# Patient Record
Sex: MALE | Race: WHITE | Employment: FULL TIME | URBAN - METROPOLITAN AREA
[De-identification: names, ages, dates, MRNs, and addresses within clinical notes are randomized per-mention and may not be internally consistent; named-entity substitution may affect disease eponyms.]

---

## 2017-02-14 ENCOUNTER — TELEPHONE (OUTPATIENT)
Dept: FAMILY MEDICINE CLINIC | Age: 52
End: 2017-02-14

## 2017-02-14 NOTE — TELEPHONE ENCOUNTER
Deborah Chavis  [phone]    Patient's wife, Silvester Mortimer, is asking for a return call with the name/number of the doctor that Dr. Ander Wisdom referred her  to for a colonoscopy. She states that her  misplaced the paper that he was given with this information. She asked that a detailed message be left if she does not answer.

## 2017-12-04 ENCOUNTER — OFFICE VISIT (OUTPATIENT)
Dept: FAMILY MEDICINE CLINIC | Age: 52
End: 2017-12-04

## 2017-12-04 VITALS
HEART RATE: 60 BPM | TEMPERATURE: 97.2 F | HEIGHT: 68 IN | DIASTOLIC BLOOD PRESSURE: 85 MMHG | RESPIRATION RATE: 18 BRPM | OXYGEN SATURATION: 99 % | BODY MASS INDEX: 24.95 KG/M2 | WEIGHT: 164.6 LBS | SYSTOLIC BLOOD PRESSURE: 129 MMHG

## 2017-12-04 DIAGNOSIS — J34.0 NASAL ULCER: ICD-10-CM

## 2017-12-04 DIAGNOSIS — R68.89 COLD INTOLERANCE: Primary | ICD-10-CM

## 2017-12-04 DIAGNOSIS — J30.89 ALLERGIC RHINITIS CAUSED BY MOLD: ICD-10-CM

## 2017-12-04 RX ORDER — MINERAL OIL
180 ENEMA (ML) RECTAL
COMMUNITY
End: 2018-05-07 | Stop reason: ALTCHOICE

## 2017-12-04 RX ORDER — LORATADINE 10 MG/1
10 TABLET ORAL
Qty: 30 TAB | COMMUNITY

## 2017-12-04 RX ORDER — ASCORBIC ACID 250 MG
TABLET ORAL
COMMUNITY
End: 2019-01-31 | Stop reason: ALTCHOICE

## 2017-12-04 NOTE — LETTER
12/29/2017 11:07 AM 
 
Mr. Rogelio Bustamante [facility] [address] [address] Dear Rogelio Bustamante: 
 
Please find your most recent results below. Resulted Orders METABOLIC PANEL, COMPREHENSIVE Result Value Ref Range Glucose 85 65 - 99 mg/dL BUN 16 6 - 24 mg/dL Creatinine 1.05 0.76 - 1.27 mg/dL GFR est non-AA 80 >59 mL/min/1.73 GFR est AA 93 >59 mL/min/1.73  
 BUN/Creatinine ratio 15 9 - 20 Sodium 142 134 - 144 mmol/L Potassium 4.4 3.5 - 5.2 mmol/L Chloride 101 96 - 106 mmol/L  
 CO2 26 18 - 29 mmol/L Calcium 9.8 8.7 - 10.2 mg/dL Protein, total 7.4 6.0 - 8.5 g/dL Albumin 5.0 3.5 - 5.5 g/dL GLOBULIN, TOTAL 2.4 1.5 - 4.5 g/dL A-G Ratio 2.1 1.2 - 2.2 Bilirubin, total 0.3 0.0 - 1.2 mg/dL Alk. phosphatase 65 39 - 117 IU/L  
 AST (SGOT) 15 0 - 40 IU/L  
 ALT (SGPT) 15 0 - 44 IU/L Narrative Performed at:  [address]  [id number] : Royce Camargo MD, Phone:  [phone] CBC W/O DIFF Result Value Ref Range WBC 7.1 3.4 - 10.8 x10E3/uL  
 RBC 4.71 4.14 - 5.80 x10E6/uL HGB 14.0 13.0 - 17.7 g/dL Comment: **Please note reference interval change** HCT 40.6 37.5 - 51.0 % MCV 86 79 - 97 fL  
 MCH 29.7 26.6 - 33.0 pg  
 MCHC 34.5 31.5 - 35.7 g/dL  
 RDW 14.2 12.3 - 15.4 % PLATELET 222 790 - 951 x10E3/uL Narrative Performed at:  [address]  [id number] : Royce Camargo MD, Phone:  [phone] TSH 3RD GENERATION Result Value Ref Range TSH 2.240 0.450 - 4.500 uIU/mL Narrative Performed at:  [address]  [id number] : Royce Camargo MD, Phone:  [phone] T4, FREE Result Value Ref Range T4, Free 1.01 0.82 - 1.77 ng/dL Narrative Performed at:  [address]  [id number] : Graciela Christensen MD, Phone:  [phone] RECOMMENDATIONS: 
 
Tests for being \"cold\" due to a health problem are all normal including thyroid and tests for anemia. This is normal for you and of no concern. Your other tests are all normal.  No diabetes, normal liver and kidney tests.  
    
   
 
 
Please call me if you have any questions: [phone] Sincerely, 
 
 
Jeannette Krabbe, MD

## 2017-12-04 NOTE — MR AVS SNAPSHOT
Visit Information Date & Time Provider Department Dept. Phone Encounter #  
 12/4/2017  4:00 PM Bianca Stahl MD [facility] [phone] [id number] Your Appointments 5/7/2018  9:00 AM  
COMPLETE PHYSICAL with Bianca Stahl MD  
[facility]) Appt Note: for his yearly cpe/  
 [address] [address]  
[phone]  
  
   
 [address] [address] Upcoming Health Maintenance Date Due Influenza Age 5 to Adult 8/1/2017 DTaP/Tdap/Td series (2 - Td) 3/28/2024 COLONOSCOPY 3/27/2027 Allergies as of 12/4/2017  Review Complete On: 12/4/2017 By: Ashlee Burrell LPN Severity Noted Reaction Type Reactions Naprosyn [Naproxen]  04/03/2015    Other (comments)  
 dizziness Current Immunizations  Never Reviewed Name Date Tdap 3/28/2014 Not reviewed this visit You Were Diagnosed With   
  
 Codes Comments Cold intolerance    -  Primary ICD-10-CM: R68.89 ICD-9-CM: 780.99 Vitals BP Pulse Temp Resp Height(growth percentile) Weight(growth percentile) 129/85 (BP 1 Location: Left arm, BP Patient Position: Sitting) 60 97.2 °F (36.2 °C) (Oral) 18 5' 8\" (1.727 m) 164 lb 9.6 oz (74.7 kg) SpO2 BMI Smoking Status 99% 25.03 kg/m2 Never Smoker Vitals History BMI and BSA Data Body Mass Index Body Surface Area 25.03 kg/m 2 1.89 m 2 Preferred Pharmacy Pharmacy Name Phone [facility] [address] [place] [address], [address] [place] [address] [phone] Your Updated Medication List  
  
   
This list is accurate as of: 12/4/17  5:16 PM.  Always use your most recent med list. ADVIL 200 mg tablet Generic drug:  ibuprofen Take 3-4 Tabs by mouth every six (6) hours as needed for Pain. VITAMIN C 250 mg tablet Generic drug:  ascorbic acid (vitamin C) Take  by mouth. We Performed the Following CBC W/O DIFF [72355 CPT(R)] METABOLIC PANEL, COMPREHENSIVE [28430 CPT(R)] T4, FREE [id number] CPT(R)] TSH 3RD GENERATION [69896 CPT(R)] Introducing [facility] SERVICES! Dear Luis Files: Thank you for requesting a [employer] account. Our records indicate that you already have an active [employer] account. You can access your account anytime at [email or web address]. [employer]/[place] Did you know that you can access your hospital and ER discharge instructions at any time in [employer]? You can also review all of your test results from your hospital stay or ER visit. Additional Information If you have questions, please visit the Frequently Asked Questions section of the [employer] website at [email or web address]/[email or web address] Remember, [employer] is NOT to be used for urgent needs. For medical emergencies, dial 911. Now available from your iPhone and Android! Please provide this summary of care documentation to your next provider. Your primary care clinician is listed as [address] [address], [facility]/s . If you have any questions after today's visit, please call [phone].

## 2017-12-04 NOTE — PROGRESS NOTES
HISTORY OF PRESENT ILLNESS  HPI  Amaury Aiken is a 45 y.o. male who presents to office today for elevated BP. Pt states that his BP was in the 160s/100s when he checked it two days ago and yesterday; he notes that he had a headache the first day he checked it and hasn't been feeling well in general. He notes his BP was normal when he checked it 2-3 weeks ago. Pt complains of always feeling cold since around 3 years ago. Pt complains of runny nose and nasal congestion, which he states he typically has this time of year. Pt notes a sore ulcer inside his nose that he notices about once a week. Past Medical History:   Diagnosis Date    Hepatitis      History reviewed. No pertinent surgical history. Current Outpatient Prescriptions on File Prior to Visit   Medication Sig Dispense Refill    ibuprofen (ADVIL) 200 mg tablet Take 3-4 Tabs by mouth every six (6) hours as needed for Pain. No current facility-administered medications on file prior to visit. Allergies   Allergen Reactions    Naprosyn [Naproxen] Other (comments)     dizziness     Family History   Problem Relation Age of Onset    No Known Problems Father     Hypertension Mother     Stroke Mother     No Known Problems Paternal Grandmother     No Known Problems Paternal Grandfather     No Known Problems Maternal Grandmother     No Known Problems Maternal Grandfather      Social History     Social History    Marital status: SINGLE     Spouse name: N/A    Number of children: N/A    Years of education: N/A     Social History Main Topics    Smoking status: Never Smoker    Smokeless tobacco: Never Used    Alcohol use Yes      Comment: maybe 1-2 drinks per 2 weeks    Drug use: No    Sexual activity: Yes     Other Topics Concern    None     Social History Narrative             Review of Systems   Constitutional: Positive for malaise/fatigue (feeling cold). Negative for chills, diaphoresis, fever and weight loss.    HENT: Positive for congestion (nasal). Runny nose   Eyes: Negative for blurred vision, double vision, pain and redness. Respiratory: Negative for cough, shortness of breath and wheezing. Cardiovascular: Negative for chest pain, palpitations, orthopnea, claudication, leg swelling and PND. Skin: Negative for itching and rash. sore spot inside nose   Neurological: Negative for dizziness, tingling, tremors, sensory change, speech change, focal weakness, seizures, loss of consciousness, weakness and headaches. Results for orders placed or performed in visit on 96/92/98   METABOLIC PANEL, COMPREHENSIVE   Result Value Ref Range    Glucose 90 65 - 99 mg/dL    BUN 13 6 - 24 mg/dL    Creatinine 0.78 0.76 - 1.27 mg/dL    GFR est non- >59 mL/min/1.73    GFR est  >59 mL/min/1.73    BUN/Creatinine ratio 17 9 - 20    Sodium 141 136 - 144 mmol/L    Potassium 4.2 3.5 - 5.2 mmol/L    Chloride 103 97 - 106 mmol/L    CO2 23 18 - 29 mmol/L    Calcium 9.4 8.7 - 10.2 mg/dL    Protein, total 6.7 6.0 - 8.5 g/dL    Albumin 4.6 3.5 - 5.5 g/dL    GLOBULIN, TOTAL 2.1 1.5 - 4.5 g/dL    A-G Ratio 2.2 1.1 - 2.5    Bilirubin, total 0.4 0.0 - 1.2 mg/dL    Alk.  phosphatase 57 39 - 117 IU/L    AST (SGOT) 15 0 - 40 IU/L    ALT (SGPT) 21 0 - 44 IU/L   LIPID PANEL   Result Value Ref Range    Cholesterol, total 207 (H) 100 - 199 mg/dL    Triglyceride 225 (H) 0 - 149 mg/dL    HDL Cholesterol 44 >39 mg/dL    VLDL, calculated 45 (H) 5 - 40 mg/dL    LDL, calculated 118 (H) 0 - 99 mg/dL   CBC W/O DIFF   Result Value Ref Range    WBC 6.1 3.4 - 10.8 x10E3/uL    RBC 4.39 4.14 - 5.80 x10E6/uL    HGB 13.2 12.6 - 17.7 g/dL    HCT 38.8 37.5 - 51.0 %    MCV 88 79 - 97 fL    MCH 30.1 26.6 - 33.0 pg    MCHC 34.0 31.5 - 35.7 g/dL    RDW 13.8 12.3 - 15.4 %    PLATELET 125 864 - 367 x10E3/uL   URINALYSIS W/MICROSCOPIC   Result Value Ref Range    Specific Gravity 1.023 1.005 - 1.030    pH (UA) 5.5 5.0 - 7.5    Color Yellow Yellow    Appearance Cloudy (A) Clear    Leukocyte Esterase Negative Negative    Protein 1+ (A) Negative/Trace    Glucose Negative Negative    Ketone Negative Negative    Blood Negative Negative    Bilirubin Negative Negative    Urobilinogen 0.2 0.2 - 1.0 mg/dL    Nitrites Negative Negative    Microscopic Examination See additional order    PSA DIAGNOSTIC (PROSTATIC SPECIFIC AG)   Result Value Ref Range    Prostate Specific Ag 2.8 0.0 - 4.0 ng/mL   CHRONIC HEPATITIS PANEL   Result Value Ref Range    Hep B surface Ag screen Negative Negative    Hepatitis Be Antigen Negative Negative    Hep B Core Ab, IgM Negative Negative    Hep B Core Ab, total Negative Negative    Hepatitis Be Antibody Negative Negative    HEP B SURFACE AB, QUAL Non Reactive     HCV Ab <0.1 0.0 - 0.9 s/co ratio   HEPATITIS A AB, IGM & TOTAL   Result Value Ref Range    Hepatitis A Ab, IgM Negative Negative    Hep A Ab, total Positive (A) Negative   MICROSCOPIC EXAMINATION   Result Value Ref Range    WBC 0-5 0 - 5 /hpf    RBC 0-2 0 - 2 /hpf    Epithelial cells 0-10 0 - 10 /hpf    Casts None seen None seen /lpf    Mucus Present Not Estab. Bacteria None seen None seen/Few   COMMENT   Result Value Ref Range    Comment Comment    CVD REPORT   Result Value Ref Range    INTERPRETATION Note              Physical Exam  Visit Vitals    /85 (BP 1 Location: Left arm, BP Patient Position: Sitting)    Pulse 60    Temp 97.2 °F (36.2 °C) (Oral)    Resp 18    Ht 5' 8\" (1.727 m)    Wt 164 lb 9.6 oz (74.7 kg)    SpO2 99%    BMI 25.03 kg/m2     Head: Normocephalic, without obvious abnormality, atraumatic  Eyes: conjunctivae/corneas clear. PERRL, EOM's intact.    Neck: supple, symmetrical, trachea midline, no adenopathy, thyroid: not enlarged, symmetric, no tenderness/mass/nodules, no carotid bruit and no JVD  Lungs: clear to auscultation bilaterally  Heart: regular rate and rhythm, S1, S2 normal, no murmur, click, rub or gallop  Extremities: extremities normal, atraumatic, no cyanosis or edema  Pulses: 2+ and symmetric  Lymph nodes: Cervical, supraclavicular, and axillary nodes normal.  Neurologic: Grossly normal          ASSESSMENT and PLAN    ICD-10-CM ICD-9-CM    1. Cold intolerance A10.52 084.75 METABOLIC PANEL, COMPREHENSIVE      CBC W/O DIFF      TSH 3RD GENERATION      T4, FREE   2. Nasal ulcer J34.0 478.19    3. Allergic rhinitis caused by mold J30.89 477.8 loratadine (CLARITIN) 10 mg tablet      fexofenadine (ALLEGRA) 180 mg tablet     Diagnoses and all orders for this visit:    1. Cold intolerance  -     METABOLIC PANEL, COMPREHENSIVE  -     CBC W/O DIFF  -     TSH 3RD GENERATION  -     T4, FREE    2. Nasal ulcer    3. Allergic rhinitis caused by mold      Follow-up Disposition: Not on File     lab results and schedule of future lab studies reviewed with patient  reviewed diet, exercise and weight control  cardiovascular risk and specific lipid/LDL goals reviewed  reviewed medications and side effects in detail  Please call my office if there are any questions- [phone]. I will arrange for follow up after the lab tests done from today return  Recommended a weekly \"heart check. \" I went into detail how to do this. Call for refills on medications as needed. Discussed expected course/resolution/complications of diagnosis in detail with patient. Medication risks/benefits/costs/interactions/alternatives discussed with patient. Pt was given an after visit summary which includes diagnoses, current medications & vitals. Pt expressed understanding with the diagnosis and plan. Total 25 minutes,60 % counseling re: We had a long discussion telling him that his elevated BP is not harmful unless it's up all the time. He should check it on his average day, not when he's feeling ill, having pain, or exercising. If it's over 140/90 on those days more than 1/3 times, we need to consider treatment.    Reviewed in detail the proper technique of checking the blood pressure- check it on an average day only, not on a stressful day, sitting, no exercise for at least 1 hour and not experiencing any new pain( chronic pain is OK). Patient encouraged to check BP sitting and standing at least once a month and to report these readings to me if > 140/ 90 on average , or if the standing BP is >  15 points lower than the sitting. Reviewed symptoms, or lack thereof, of hypertension and elevated cholesterol. We talked about him feeling cold; he complained about this a couple years ago. We checked the lab work then, including thyroid tests and CBC, and it all came back normal; we'll check it again today. BMI is slightly elevated- in the overweight range. I reviewed diet, exercise and weight control. Discussed weight control in detail, the importance of mainly decreased carbs, and for weight maintenance, exercise; discussed different diets and that it isn't as important to watch the type of foods as it is to decrease calorie intake no matter what type of diet you do, etc.     For his nasal congestion, I recommended he try Allegra or Claritin. If no better in 2-3 days, he should switch to the other. If still no better, we'll have him stay on the one that seems to help the most and call for a nasal steroid. He'll do that through [address] I informed him that he needs to stay on medicine throughout his allergy season, which seems to be mostly mold, as he has problems from late summer to spring that seems to get better. For his nasal soreness, I suggested he use Bacitracin antibiotic ointment prn up to BID. If he continues to have problems with this, we'll have him see the ENT doctor. Also, discussed symptoms of concern that were noted today in the note above, treatment options( including doing nothing), when to follow up before recommended time frame. Also, answered all questions.     This document was written by Maik Ferrari, as dictated by Amari Morales MD.  I have reviewed and agree with the above note and have made corrections where appropriate Twan Flores M.D.

## 2017-12-04 NOTE — PROGRESS NOTES
\"Reviewed record in preparation for visit and have obtained the necessary documentation\"  Chief Complaint   Patient presents with    Elevated Blood Pressure     \"going up and down\" 160's/100's       1. Have you been to the ER, urgent care clinic since your last visit? Hospitalized since your last visit? No    2. Have you seen or consulted any other health care providers outside of the [address] [place] [address] since your last visit? Include any pap smears or colon screening.  No

## 2017-12-05 LAB
ALBUMIN SERPL-MCNC: 5 G/DL (ref 3.5–5.5)
ALBUMIN/GLOB SERPL: 2.1 {RATIO} (ref 1.2–2.2)
ALP SERPL-CCNC: 65 IU/L (ref 39–117)
ALT SERPL-CCNC: 15 IU/L (ref 0–44)
AST SERPL-CCNC: 15 IU/L (ref 0–40)
BILIRUB SERPL-MCNC: 0.3 MG/DL (ref 0–1.2)
BUN SERPL-MCNC: 16 MG/DL (ref 6–24)
BUN/CREAT SERPL: 15 (ref 9–20)
CALCIUM SERPL-MCNC: 9.8 MG/DL (ref 8.7–10.2)
CHLORIDE SERPL-SCNC: 101 MMOL/L (ref 96–106)
CO2 SERPL-SCNC: 26 MMOL/L (ref 18–29)
CREAT SERPL-MCNC: 1.05 MG/DL (ref 0.76–1.27)
ERYTHROCYTE [DISTWIDTH] IN BLOOD BY AUTOMATED COUNT: 14.2 % (ref 12.3–15.4)
GFR SERPLBLD CREATININE-BSD FMLA CKD-EPI: 80 ML/MIN/1.73
GFR SERPLBLD CREATININE-BSD FMLA CKD-EPI: 93 ML/MIN/1.73
GLOBULIN SER CALC-MCNC: 2.4 G/DL (ref 1.5–4.5)
GLUCOSE SERPL-MCNC: 85 MG/DL (ref 65–99)
HCT VFR BLD AUTO: 40.6 % (ref 37.5–51)
HGB BLD-MCNC: 14 G/DL (ref 13–17.7)
MCH RBC QN AUTO: 29.7 PG (ref 26.6–33)
MCHC RBC AUTO-ENTMCNC: 34.5 G/DL (ref 31.5–35.7)
MCV RBC AUTO: 86 FL (ref 79–97)
PLATELET # BLD AUTO: 252 X10E3/UL (ref 150–379)
POTASSIUM SERPL-SCNC: 4.4 MMOL/L (ref 3.5–5.2)
PROT SERPL-MCNC: 7.4 G/DL (ref 6–8.5)
RBC # BLD AUTO: 4.71 X10E6/UL (ref 4.14–5.8)
SODIUM SERPL-SCNC: 142 MMOL/L (ref 134–144)
T4 FREE SERPL-MCNC: 1.01 NG/DL (ref 0.82–1.77)
TSH SERPL DL<=0.005 MIU/L-ACNC: 2.24 UIU/ML (ref 0.45–4.5)
WBC # BLD AUTO: 7.1 X10E3/UL (ref 3.4–10.8)

## 2017-12-29 NOTE — PROGRESS NOTES
Tests for being \"cold\" due to a health problem are all normal including thyroid and tests for anemia. This is normal for you and of no concern. Your other tests are all normal.  No diabetes, normal liver and kidney tests.

## 2018-05-07 ENCOUNTER — OFFICE VISIT (OUTPATIENT)
Dept: FAMILY MEDICINE CLINIC | Age: 53
End: 2018-05-07

## 2018-05-07 VITALS
DIASTOLIC BLOOD PRESSURE: 62 MMHG | BODY MASS INDEX: 24.55 KG/M2 | HEIGHT: 68 IN | WEIGHT: 162 LBS | HEART RATE: 58 BPM | TEMPERATURE: 98.5 F | OXYGEN SATURATION: 98 % | SYSTOLIC BLOOD PRESSURE: 110 MMHG | RESPIRATION RATE: 18 BRPM

## 2018-05-07 DIAGNOSIS — J30.89 ALLERGIC RHINITIS CAUSED BY MOLD: Chronic | ICD-10-CM

## 2018-05-07 DIAGNOSIS — E78.2 ELEVATED TRIGLYCERIDES WITH HIGH CHOLESTEROL: ICD-10-CM

## 2018-05-07 DIAGNOSIS — E55.9 VITAMIN D DEFICIENCY: ICD-10-CM

## 2018-05-07 DIAGNOSIS — Z00.00 PHYSICAL EXAM: Primary | ICD-10-CM

## 2018-05-07 RX ORDER — FLUTICASONE PROPIONATE 50 MCG
2 SPRAY, SUSPENSION (ML) NASAL DAILY
Qty: 1 BOTTLE | Refills: 11 | Status: SHIPPED | OUTPATIENT
Start: 2018-05-07 | End: 2019-01-31 | Stop reason: ALTCHOICE

## 2018-05-07 NOTE — LETTER
7/9/2018 10:13 AM 
 
Mr. Hieu Lee [facility] [address] [address] Dear Hieu Lee: 
 
Please find your most recent results below. Resulted Orders LIPID PANEL Result Value Ref Range Cholesterol, total 259 (H) 100 - 199 mg/dL Triglyceride 753 (HH) 0 - 149 mg/dL HDL Cholesterol 32 (L) >39 mg/dL VLDL, calculated Comment 5 - 40 mg/dL Comment:  
   The calculation for the VLDL cholesterol is not valid when 
triglyceride level is >400 mg/dL. LDL, calculated Comment 0 - 99 mg/dL Comment:  
   Triglyceride result indicated is too high for an accurate LDL 
cholesterol estimation. Narrative Performed at:  [address]  [id number] : Yolie Boyd MD, Phone:  [phone] METABOLIC PANEL, COMPREHENSIVE Result Value Ref Range Glucose 89 65 - 99 mg/dL BUN 15 6 - 24 mg/dL Creatinine 0.85 0.76 - 1.27 mg/dL GFR est non-AA 99 >59 mL/min/1.73 GFR est  >59 mL/min/1.73  
 BUN/Creatinine ratio 18 9 - 20 Sodium 139 134 - 144 mmol/L Potassium 4.9 3.5 - 5.2 mmol/L Chloride 103 96 - 106 mmol/L  
 CO2 23 18 - 29 mmol/L Calcium 9.2 8.7 - 10.2 mg/dL Protein, total 6.9 6.0 - 8.5 g/dL Albumin 4.6 3.5 - 5.5 g/dL GLOBULIN, TOTAL 2.3 1.5 - 4.5 g/dL A-G Ratio 2.0 1.2 - 2.2 Bilirubin, total 0.2 0.0 - 1.2 mg/dL Alk. phosphatase 67 39 - 117 IU/L  
 AST (SGOT) 18 0 - 40 IU/L  
 ALT (SGPT) 25 0 - 44 IU/L Narrative Performed at:  [address]  [id number] : Yolie Boyd MD, Phone:  [phone] PSA, DIAGNOSTIC (PROSTATE SPECIFIC AG) Result Value Ref Range Prostate Specific Ag 3.4 0.0 - 4.0 ng/mL Comment:  
   Roche ECLIA methodology.  
According to the American Urological Association, Serum PSA should 
decrease and remain at undetectable levels after radical 
 prostatectomy. The AUA defines biochemical recurrence as an initial 
PSA value 0.2 ng/mL or greater followed by a subsequent confirmatory PSA value 0.2 ng/mL or greater. Values obtained with different assay methods or kits cannot be used 
interchangeably. Results cannot be interpreted as absolute evidence 
of the presence or absence of malignant disease. Narrative Performed at:  [address]  [id number] : Glenn Alejo MD, Phone:  [phone] CBC W/O DIFF Result Value Ref Range WBC 5.0 3.4 - 10.8 x10E3/uL  
 RBC 4.65 4.14 - 5.80 x10E6/uL HGB 14.3 13.0 - 17.7 g/dL HCT 41.6 37.5 - 51.0 % MCV 90 79 - 97 fL  
 MCH 30.8 26.6 - 33.0 pg  
 MCHC 34.4 31.5 - 35.7 g/dL  
 RDW 14.0 12.3 - 15.4 % PLATELET 324 428 - 987 x10E3/uL Narrative Performed at:  [address]  [id number] : Glenn Alejo MD, Phone:  [phone] URINALYSIS W/ RFLX MICROSCOPIC Result Value Ref Range Specific Gravity 1.010 1.005 - 1.030  
 pH (UA) 5.0 5.0 - 7.5 Color Yellow Yellow Appearance Clear Clear Leukocyte Esterase Negative Negative Protein Negative Negative/Trace Glucose Negative Negative Ketone Negative Negative Blood Negative Negative Bilirubin Negative Negative Urobilinogen 0.2 0.2 - 1.0 mg/dL Nitrites Negative Negative Microscopic Examination Comment Comment:  
   Microscopic not indicated and not performed. Narrative Performed at:  [address]  [id number] : Glenn Alejo MD, Phone:  [phone] VITAMIN D, 25 HYDROXY Result Value Ref Range VITAMIN D, 25-HYDROXY 13.4 (L) 30.0 - 100.0 ng/mL Comment:  
   Vitamin D deficiency has been defined by the [facility] practice guideline as a 
level of serum 25-OH vitamin D less than 20 ng/mL (1,2). The Endocrine Society went on to further define vitamin D 
insufficiency as a level between 21 and 29 ng/mL (2). 1. IOM ([place] [employer]). 2010. Dietary reference 
   intakes for calcium and D. [facility]: The 
   [employer]. 2. Pao MF, Philip HAUESR, Leonela JOHNSON, et al. 
   Evaluation, treatment, and prevention of vitamin D 
   deficiency: an Endocrine Society clinical practice 
   guideline. JCEM. 2011 Jul; 96(7):1911-30. Narrative Performed at:  [address]  [id number] : nAn Betancourt MD, Phone:  [phone] CVD REPORT Result Value Ref Range INTERPRETATION Note Comment:  
   Medical Director's Note: A CV Risk Assessment Report was not 
sent because both LDL cholesterol and non-HDL cholesterol 
results are required to generate a report. A [employer] interpretive report has not been generated 
since ordered tests are not currently relevant to the 
program. 
  
 PDF IMAGE Not applicable Narrative Performed at:  [address] [place] [address] [address] [place] [address]  [id number] : Iza Riggins MD, Phone:  [phone] RECOMMENDATIONS: 
 
  
    
  The Vit D is still very low- you need to take OTC Vit D 5000 units daily anytime of day but it is absorbed better with a meal. Only other concern is the markedly elevated Triglyceride. I want you to repeat your cholesterol, but due to the high triglyceride,  I want you fasting except non-caloric liquids( water,diet drinks, black coffee and tea) are OK for 14 hrs before the test is done.  This high level of triglycerides is associated with significant potential health problems that we can discuss if they don't go down below 400 when we recheck it. Everything else is OK.  No diabetes, normal PSA(prostate cancer test) , liver and kidney tests. Please call me if you have any questions: [phone] Sincerely, 
 Sanju Aquino MD

## 2018-05-07 NOTE — PROGRESS NOTES
Chief Complaint   Patient presents with    Physical     fasting    Allergic Rhinitis   1. Have you been to the ER, urgent care clinic since your last visit? Hospitalized since your last visit? No    2. Have you seen or consulted any other health care providers outside of the [facility] since your last visit? Include any pap smears or colon screening.  No

## 2018-05-07 NOTE — MR AVS SNAPSHOT
[address] 
[phone] Patient: Rubens Means MRN: [id number] :1965 Visit Information Date & Time Provider Department Dept. Phone Encounter #  
 2018  9:00 AM Jazmín Brown  [place] [phone] [id number] Upcoming Health Maintenance Date Due Influenza Age 5 to Adult 2018 DTaP/Tdap/Td series (2 - Td) 3/28/2024 COLONOSCOPY 3/27/2027 Allergies as of 2018  Review Complete On: 2018 By: Lonnie Haddad LPN Severity Noted Reaction Type Reactions Naprosyn [Naproxen]  2015    Other (comments)  
 dizziness Current Immunizations  Never Reviewed Name Date Tdap 3/28/2014 Not reviewed this visit You Were Diagnosed With   
  
 Codes Comments Physical exam    -  Primary ICD-10-CM: Z00.00 ICD-9-CM: V70.9 Vitamin D deficiency     ICD-10-CM: E55.9 ICD-9-CM: 268.9 Vitals BP Pulse Temp Resp Height(growth percentile) Weight(growth percentile) 110/62 (!) 58 98.5 °F (36.9 °C) 18 5' 8\" (1.727 m) 162 lb (73.5 kg) SpO2 BMI Smoking Status 98% 24.63 kg/m2 Never Smoker BMI and BSA Data Body Mass Index Body Surface Area  
 24.63 kg/m 2 1.88 m 2 Preferred Pharmacy Pharmacy Name Phone [facility] [address] [phone] Your Updated Medication List  
  
   
This list is accurate as of 18 11:25 AM.  Always use your most recent med list. ADVIL 200 mg tablet Generic drug:  ibuprofen Take 3-4 Tabs by mouth every six (6) hours as needed for Pain. CLARITIN 10 mg tablet Generic drug:  loratadine Take 1 Tab by mouth daily. fluticasone 50 mcg/actuation nasal spray Commonly known as:  Ruth Starks 2 Sprays by Both Nostrils route daily. VITAMIN C 250 mg tablet Generic drug:  ascorbic acid (vitamin C) Take  by mouth. Prescriptions Sent to Pharmacy Refills  
 fluticasone (FLONASE) 50 mcg/actuation nasal spray 11 Si Sprays by Both Nostrils route daily. Class: Normal  
 Pharm[facility] [address]  [facility] [address] [place] [address] #: [phone] Route: Both Nostrils We Performed the Following CBC W/O DIFF [30283 CPT(R)] LIPID PANEL [94735 CPT(R)] METABOLIC PANEL, COMPREHENSIVE [42498 CPT(R)] PSA, DIAGNOSTIC (PROSTATE SPECIFIC AG) [id number] CPT(R)] URINALYSIS W/ RFLX MICROSCOPIC [72087 CPT(R)] VITAMIN D, 25 HYDROXY [id number] CPT(R)] Introducing [facility]! Dear Juan Johnson: Thank you for requesting a [employer] account. Our records indicate that you already have an active [employer] account. You can access your account anytime at [email or web address] Did you know that you can access your hospital and ER discharge instructions at any time in [employer]? You can also review all of your test results from your hospital stay or ER visit. Additional Information If you have questions, please visit the Frequently Asked Questions section of the [employer] website at [email or web address] Remember, [employer] is NOT to be used for urgent needs. For medical emergencies, dial 911. Now available from your iPhone and Android! Please provide this summary of care documentation to your next provider. Your primary care clinician is listed as [place] [address], [facility]/s . If you have any questions after today's visit, please call [phone].

## 2018-05-07 NOTE — PROGRESS NOTES
HISTORY OF PRESENT ILLNESS  HPI  Hieu Lee is a 45 y.o. Male with a history of vitamin D deficiency, who presents at the office today for a complete physical. Pt states that he is having some issues with allergies and is taking Claritin. He notes that his allergies are typically worse around winter. He notes that he does not seem any worse around pets. Pt states that he has tried Allegra with no relief. Past Medical History:   Diagnosis Date    Allergic rhinitis     Allergic rhinitis caused by mold 5/13/2018    History of hepatitis A 1995    Hep B & C neg 2014; hepatitis A IgM-, IgG+    Vitamin D deficiency      History reviewed. No pertinent surgical history. Current Outpatient Prescriptions on File Prior to Visit   Medication Sig Dispense Refill    ascorbic acid, vitamin C, (VITAMIN C) 250 mg tablet Take  by mouth.  loratadine (CLARITIN) 10 mg tablet Take 1 Tab by mouth daily. 30 Tab prn    ibuprofen (ADVIL) 200 mg tablet Take 3-4 Tabs by mouth every six (6) hours as needed for Pain. No current facility-administered medications on file prior to visit.       Allergies   Allergen Reactions    Naprosyn [Naproxen] Other (comments)     dizziness     Family History   Problem Relation Age of Onset    No Known Problems Father     Hypertension Mother     Stroke Mother 48    Other Paternal Grandmother 80     age   [facility] Heart Attack Paternal Grandfather      unclear age   [facility] Stroke Maternal Grandmother 79    Other Maternal Grandfather      WWII     Social History     Social History    Marital status: SINGLE     Spouse name: N/A    Number of children: N/A    Years of education: N/A     Social History Main Topics    Smoking status: Never Smoker    Smokeless tobacco: Never Used    Alcohol use Yes      Comment: maybe 1-2 drinks per 2 weeks    Drug use: No    Sexual activity: Yes     Other Topics Concern    None     Social History Narrative             Review of Systems Constitutional: Negative for chills, diaphoresis, fever, malaise/fatigue and weight loss. HENT: Negative for congestion, ear discharge, ear pain, hearing loss, nosebleeds, sore throat and tinnitus. Eyes: Negative for blurred vision, double vision, photophobia, pain, discharge and redness. Respiratory: Negative for cough, hemoptysis, sputum production, shortness of breath, wheezing and stridor. Cardiovascular: Negative for chest pain, palpitations, orthopnea, claudication, leg swelling and PND. Gastrointestinal: Negative for abdominal pain, blood in stool, constipation, diarrhea, heartburn, melena, nausea and vomiting. Genitourinary: Negative for dysuria, flank pain, frequency, hematuria and urgency. Musculoskeletal: Negative for back pain, falls, joint pain, myalgias and neck pain. Skin: Negative for itching and rash. Neurological: Negative for dizziness, tingling, tremors, sensory change, speech change, focal weakness, seizures, loss of consciousness, weakness and headaches. Endo/Heme/Allergies: Positive for environmental allergies (Winter). Negative for polydipsia. Does not bruise/bleed easily. Psychiatric/Behavioral: Negative for depression, hallucinations, memory loss, substance abuse and suicidal ideas. The patient is not nervous/anxious and does not have insomnia.       Results for orders placed or performed in visit on 05/07/18   LIPID PANEL   Result Value Ref Range    Cholesterol, total 259 (H) 100 - 199 mg/dL    Triglyceride 753 (HH) 0 - 149 mg/dL    HDL Cholesterol 32 (L) >39 mg/dL    VLDL, calculated Comment 5 - 40 mg/dL    LDL, calculated Comment 0 - 99 mg/dL   METABOLIC PANEL, COMPREHENSIVE   Result Value Ref Range    Glucose 89 65 - 99 mg/dL    BUN 15 6 - 24 mg/dL    Creatinine 0.85 0.76 - 1.27 mg/dL    GFR est non-AA 99 >59 mL/min/1.73    GFR est  >59 mL/min/1.73    BUN/Creatinine ratio 18 9 - 20    Sodium 139 134 - 144 mmol/L    Potassium 4.9 3.5 - 5.2 mmol/L Chloride 103 96 - 106 mmol/L    CO2 23 18 - 29 mmol/L    Calcium 9.2 8.7 - 10.2 mg/dL    Protein, total 6.9 6.0 - 8.5 g/dL    Albumin 4.6 3.5 - 5.5 g/dL    GLOBULIN, TOTAL 2.3 1.5 - 4.5 g/dL    A-G Ratio 2.0 1.2 - 2.2    Bilirubin, total 0.2 0.0 - 1.2 mg/dL    Alk. phosphatase 67 39 - 117 IU/L    AST (SGOT) 18 0 - 40 IU/L    ALT (SGPT) 25 0 - 44 IU/L   PSA, DIAGNOSTIC (PROSTATE SPECIFIC AG)   Result Value Ref Range    Prostate Specific Ag 3.4 0.0 - 4.0 ng/mL   CBC W/O DIFF   Result Value Ref Range    WBC 5.0 3.4 - 10.8 x10E3/uL    RBC 4.65 4.14 - 5.80 x10E6/uL    HGB 14.3 13.0 - 17.7 g/dL    HCT 41.6 37.5 - 51.0 %    MCV 90 79 - 97 fL    MCH 30.8 26.6 - 33.0 pg    MCHC 34.4 31.5 - 35.7 g/dL    RDW 14.0 12.3 - 15.4 %    PLATELET 419 878 - 413 x10E3/uL   URINALYSIS W/ RFLX MICROSCOPIC   Result Value Ref Range    Specific Gravity 1.010 1.005 - 1.030    pH (UA) 5.0 5.0 - 7.5    Color Yellow Yellow    Appearance Clear Clear    Leukocyte Esterase Negative Negative    Protein Negative Negative/Trace    Glucose Negative Negative    Ketone Negative Negative    Blood Negative Negative    Bilirubin Negative Negative    Urobilinogen 0.2 0.2 - 1.0 mg/dL    Nitrites Negative Negative    Microscopic Examination Comment    VITAMIN D, 25 HYDROXY   Result Value Ref Range    VITAMIN D, 25-HYDROXY 13.4 (L) 30.0 - 100.0 ng/mL   CVD REPORT   Result Value Ref Range    INTERPRETATION Note     PDF IMAGE Not applicable            Physical Exam  Visit Vitals    /62    Pulse (!) 58    Temp 98.5 °F (36.9 °C)    Resp 18    Ht 5' 8\" (1.727 m)    Wt 162 lb (73.5 kg)    SpO2 98%    BMI 24.63 kg/m2     General:  Alert, cooperative, no distress, appears stated age. Head:  Normocephalic, without obvious abnormality, atraumatic. Eyes:  Conjunctivae/corneas clear. PERRL, EOMs intact. Fundi benign   Ears:  Normal TMs and external ear canals both ears. Increased flaky wax in both sides. Nose: Nares normal. Septum midline.  Mucosa normal. No drainage or sinus tenderness. Moderate bilateral congestion with no erythema. Throat: Lips, mucosa, and tongue normal. Teeth and gums normal.   Neck: Supple, symmetrical, trachea midline, no adenopathy, thyroid: no enlargement/tenderness/nodules, no carotid bruit and no JVD. Back:   Symmetric, no curvature. ROM normal. No CVA tenderness. Lungs:   Clear to auscultation bilaterally. Chest wall:  No tenderness or deformity. Heart:  Regular rate and rhythm, S1, S2 normal, no murmur, click, rub or gallop. Abdomen:   Soft, non-tender. Bowel sounds normal. No masses,  No organomegaly. Genitalia:  Normal male without lesion, discharge or tenderness. Rectal:  Normal tone, normal prostate, no masses or tenderness  Guaiac negative stool. Extremities: Extremities normal, atraumatic, no cyanosis or edema. Pulses: 2+ and symmetric all extremities. Skin: Skin color, texture, turgor normal. No rashes or lesions   Lymph nodes: Cervical, supraclavicular, and axillary nodes normal.   Neurologic: CNII-XII intact. Normal strength, sensation and reflexes throughout. ASSESSMENT and PLAN    ICD-10-CM ICD-9-CM    1. Physical exam Z00.00 V70.9 LIPID PANEL      METABOLIC PANEL, COMPREHENSIVE      PSA, DIAGNOSTIC (PROSTATE SPECIFIC AG)      CBC W/O DIFF      URINALYSIS W/ RFLX MICROSCOPIC      VITAMIN D, 25 HYDROXY   2. Vitamin D deficiency E55.9 268.9 VITAMIN D, 25 HYDROXY   3. Allergic rhinitis caused by mold J30.89 477.8 fluticasone (FLONASE) 50 mcg/actuation nasal spray     Diagnoses and all orders for this visit:    1. Physical exam  -     LIPID PANEL  -     METABOLIC PANEL, COMPREHENSIVE  -     PROSTATE SPECIFIC AG  -     CBC W/O DIFF  -     URINALYSIS W/ RFLX MICROSCOPIC  -     VITAMIN D, 25 HYDROXY    2. Vitamin D deficiency  -     VITAMIN D, 25 HYDROXY    3.  Allergic rhinitis caused by mold  -     fluticasone (FLONASE) 50 mcg/actuation nasal spray; 2 Sprays by Both Nostrils route daily. Other orders  -     CVD REPORT      Follow-up Disposition:  Return in about 1 year (around 5/7/2019), or new problems, for prostate check. lab results and schedule of future lab studies reviewed with patient  reviewed diet, exercise and weight control  cardiovascular risk and specific lipid/LDL goals reviewed  reviewed medications and side effects in detail  Please call my office if there are any questions- [phone]. I will arrange for follow up after the lab tests done from today return    Call for refills on medications as needed. Discussed expected course/resolution/complications of diagnosis in detail with patient. Medication risks/benefits/costs/interactions/alternatives discussed with patient. Pt was given an after visit summary which includes diagnoses, current medications & vitals. Pt expressed understanding with the diagnosis and plan     Regular exercise is very important to your health; it helps mentally, physically, socially; it prevents injuries if done properly. Exercise, even as simple as walking 20-30 minutes daily has major benefits to your health even though your \"numbers\" are the same in the lab. See if you can add this into your daily regimen and after a few months it will become a regular habit-\"just something you do,\" like brushing your teeth. A combination of aerobic exercise and strengthening and stretching is felt to be the best for you, so this should be your ultimate goal.   This can be done in the privacy of your home or in a group setting as at the gym  Some prefer having a , others prefer to do exercise in groups or individually. Do what \"works\" for you. You need to make it simple and \"fun,\" or you most likely you will not continue it. Recommended a weekly \"heart check. \" I went into detail how to do this.   Tetanus up to date with TDAP in 2014    This document was written by Waldemar Amaya, as dictated by Helene Mascorro MD.  I have reviewed and agree with the above note and have made corrections where appropriate Twan Maldonado M.D.

## 2018-05-08 LAB
25(OH)D3+25(OH)D2 SERPL-MCNC: 13.4 NG/ML (ref 30–100)
ALBUMIN SERPL-MCNC: 4.6 G/DL (ref 3.5–5.5)
ALBUMIN/GLOB SERPL: 2 {RATIO} (ref 1.2–2.2)
ALP SERPL-CCNC: 67 IU/L (ref 39–117)
ALT SERPL-CCNC: 25 IU/L (ref 0–44)
APPEARANCE UR: CLEAR
AST SERPL-CCNC: 18 IU/L (ref 0–40)
BILIRUB SERPL-MCNC: 0.2 MG/DL (ref 0–1.2)
BILIRUB UR QL STRIP: NEGATIVE
BUN SERPL-MCNC: 15 MG/DL (ref 6–24)
BUN/CREAT SERPL: 18 (ref 9–20)
CALCIUM SERPL-MCNC: 9.2 MG/DL (ref 8.7–10.2)
CHLORIDE SERPL-SCNC: 103 MMOL/L (ref 96–106)
CHOLEST SERPL-MCNC: 259 MG/DL (ref 100–199)
CO2 SERPL-SCNC: 23 MMOL/L (ref 18–29)
COLOR UR: YELLOW
CREAT SERPL-MCNC: 0.85 MG/DL (ref 0.76–1.27)
ERYTHROCYTE [DISTWIDTH] IN BLOOD BY AUTOMATED COUNT: 14 % (ref 12.3–15.4)
GFR SERPLBLD CREATININE-BSD FMLA CKD-EPI: 114 ML/MIN/1.73
GFR SERPLBLD CREATININE-BSD FMLA CKD-EPI: 99 ML/MIN/1.73
GLOBULIN SER CALC-MCNC: 2.3 G/DL (ref 1.5–4.5)
GLUCOSE SERPL-MCNC: 89 MG/DL (ref 65–99)
GLUCOSE UR QL: NEGATIVE
HCT VFR BLD AUTO: 41.6 % (ref 37.5–51)
HDLC SERPL-MCNC: 32 MG/DL
HGB BLD-MCNC: 14.3 G/DL (ref 13–17.7)
HGB UR QL STRIP: NEGATIVE
INTERPRETATION, 910389: NORMAL
KETONES UR QL STRIP: NEGATIVE
LDLC SERPL CALC-MCNC: ABNORMAL MG/DL (ref 0–99)
LEUKOCYTE ESTERASE UR QL STRIP: NEGATIVE
MCH RBC QN AUTO: 30.8 PG (ref 26.6–33)
MCHC RBC AUTO-ENTMCNC: 34.4 G/DL (ref 31.5–35.7)
MCV RBC AUTO: 90 FL (ref 79–97)
MICRO URNS: NORMAL
NITRITE UR QL STRIP: NEGATIVE
PDF IMAGE, 910387: NORMAL
PH UR STRIP: 5 [PH] (ref 5–7.5)
PLATELET # BLD AUTO: 236 X10E3/UL (ref 150–379)
POTASSIUM SERPL-SCNC: 4.9 MMOL/L (ref 3.5–5.2)
PROT SERPL-MCNC: 6.9 G/DL (ref 6–8.5)
PROT UR QL STRIP: NEGATIVE
PSA SERPL-MCNC: 3.4 NG/ML (ref 0–4)
RBC # BLD AUTO: 4.65 X10E6/UL (ref 4.14–5.8)
SODIUM SERPL-SCNC: 139 MMOL/L (ref 134–144)
SP GR UR: 1.01 (ref 1–1.03)
TRIGL SERPL-MCNC: 753 MG/DL (ref 0–149)
UROBILINOGEN UR STRIP-MCNC: 0.2 MG/DL (ref 0.2–1)
VLDLC SERPL CALC-MCNC: ABNORMAL MG/DL (ref 5–40)
WBC # BLD AUTO: 5 X10E3/UL (ref 3.4–10.8)

## 2018-05-13 PROBLEM — J30.89 ALLERGIC RHINITIS CAUSED BY MOLD: Chronic | Status: ACTIVE | Noted: 2018-05-13

## 2018-07-07 DIAGNOSIS — E55.9 VITAMIN D DEFICIENCY: Primary | ICD-10-CM

## 2018-07-07 RX ORDER — CHOLECALCIFEROL TAB 125 MCG (5000 UNIT) 125 MCG
5000 TAB ORAL DAILY
COMMUNITY
Start: 2018-07-07 | End: 2019-01-31 | Stop reason: ALTCHOICE

## 2018-07-07 NOTE — PROGRESS NOTES
The Vit D is still very low- you need to take OTC Vit D 5000 units daily anytime of day but it is absorbed better with a meal. Only other concern is the markedly elevated Triglyceride. I want you to repeat your cholesterol, but due to the high triglyceride,  I want you fasting except non-caloric liquids( water,diet drinks, black coffee and tea) are OK for 14 hrs before the test is done. This high level of triglycerides is associated with significant potential health problems that we can discuss if they don't go down below 400 when we recheck it. Everything else is OK. No diabetes, normal PSA(prostate cancer test) , liver and kidney tests.

## 2019-01-29 ENCOUNTER — OFFICE VISIT (OUTPATIENT)
Dept: FAMILY MEDICINE CLINIC | Age: 54
End: 2019-01-29

## 2019-01-29 VITALS
HEIGHT: 68 IN | HEART RATE: 58 BPM | TEMPERATURE: 97.6 F | BODY MASS INDEX: 25.13 KG/M2 | RESPIRATION RATE: 16 BRPM | WEIGHT: 165.8 LBS | SYSTOLIC BLOOD PRESSURE: 119 MMHG | DIASTOLIC BLOOD PRESSURE: 80 MMHG | OXYGEN SATURATION: 99 %

## 2019-01-29 DIAGNOSIS — R07.9 CHEST PAIN, UNSPECIFIED TYPE: Primary | ICD-10-CM

## 2019-01-29 DIAGNOSIS — J30.89 ALLERGIC RHINITIS CAUSED BY MOLD: Chronic | ICD-10-CM

## 2019-01-29 DIAGNOSIS — E55.9 VITAMIN D DEFICIENCY: ICD-10-CM

## 2019-01-29 DIAGNOSIS — E78.2 ELEVATED TRIGLYCERIDES WITH HIGH CHOLESTEROL: ICD-10-CM

## 2019-01-29 RX ORDER — ALBUTEROL SULFATE 90 UG/1
1 AEROSOL, METERED RESPIRATORY (INHALATION)
Qty: 1 INHALER | Refills: 11 | Status: SHIPPED | OUTPATIENT
Start: 2019-01-29

## 2019-01-29 NOTE — PROGRESS NOTES
HISTORY OF PRESENT ILLNESS 
HPI Jayce Cousin is a 52 y.o. Male with a history of low back pain, hepatitis and vitamin D deficiency, who presents at the office today for dyspnea and chest pain. Pt reports that he started having chest pain and difficulty breathing approximately 1-2 weeks ago. Pt states that he was swimming when his symptoms first presented, and he noticed that he was not able to hold his breath as long as he usually could, but he could swim with regular breathing without any restriction Pt indicates that his chest discomfort is constant and is located on the left side of his chest and radiates around to his L sh blade area. . Pt notes that the pain is mostly like a pressure, with occasional burning pain. Pt reports that he has allergies around this time of year, and has been having symptoms of runny nose, followed by nasal congestion for the past month or 2. Pt also notes that he has had some chills, but denies fever or cough. Pt denies unusual SOB, chest pain, and any recent ER visits or hospitalizations. Past Medical History:  
Diagnosis Date  Allergic rhinitis  Allergic rhinitis caused by mold 5/13/2018  History of hepatitis A 1995 Hep B & C neg 2014; hepatitis A IgM-, IgG+  Vitamin D deficiency History reviewed. No pertinent surgical history. Current Outpatient Medications on File Prior to Visit Medication Sig Dispense Refill  loratadine (CLARITIN) 10 mg tablet Take 1 Tab by mouth daily. 30 Tab prn  ibuprofen (ADVIL) 200 mg tablet Take 3-4 Tabs by mouth every six (6) hours as needed for Pain. No current facility-administered medications on file prior to visit. Allergies Allergen Reactions  Naprosyn [Naproxen] Other (comments)  
  dizziness Family History Problem Relation Age of Onset  No Known Problems Father  Hypertension Mother  Stroke Mother 48  
 Other Paternal Grandmother 80  
     age  Heart Attack Paternal Grandfather   
     unclear age  Stroke Maternal Grandmother 79  
 Other Maternal Grandfather   
     WWII Social History Socioeconomic History  Marital status: SINGLE Spouse name: Not on file  Number of children: Not on file  Years of education: Not on file  Highest education level: Not on file Tobacco Use  Smoking status: Never Smoker  Smokeless tobacco: Never Used Substance and Sexual Activity  Alcohol use: Yes Comment: maybe 1-2 drinks per 2 weeks  Drug use: No  
 Sexual activity: Yes Review of Systems Constitutional: Positive for chills. Negative for diaphoresis, fever, malaise/fatigue and weight loss. HENT: Positive for congestion (nasal, R>L). Eyes: Negative for blurred vision, double vision, pain and redness. Respiratory: Positive for shortness of breath. Negative for cough and wheezing. Cardiovascular: Positive for chest pain (left side, around back). Negative for palpitations, orthopnea, claudication, leg swelling and PND. Skin: Negative for itching and rash. Neurological: Negative for dizziness, tingling, tremors, sensory change, speech change, focal weakness, seizures, loss of consciousness, weakness and headaches. Results for orders placed or performed in visit on 05/07/18 LIPID PANEL Result Value Ref Range Cholesterol, total 259 (H) 100 - 199 mg/dL Triglyceride 753 (HH) 0 - 149 mg/dL HDL Cholesterol 32 (L) >39 mg/dL VLDL, calculated Comment 5 - 40 mg/dL LDL, calculated Comment 0 - 99 mg/dL METABOLIC PANEL, COMPREHENSIVE Result Value Ref Range Glucose 89 65 - 99 mg/dL BUN 15 6 - 24 mg/dL Creatinine 0.85 0.76 - 1.27 mg/dL GFR est non-AA 99 >59 mL/min/1.73 GFR est  >59 mL/min/1.73  
 BUN/Creatinine ratio 18 9 - 20 Sodium 139 134 - 144 mmol/L Potassium 4.9 3.5 - 5.2 mmol/L  Chloride 103 96 - 106 mmol/L  
 CO2 23 18 - 29 mmol/L  
 Calcium 9.2 8.7 - 10.2 mg/dL Protein, total 6.9 6.0 - 8.5 g/dL Albumin 4.6 3.5 - 5.5 g/dL GLOBULIN, TOTAL 2.3 1.5 - 4.5 g/dL A-G Ratio 2.0 1.2 - 2.2 Bilirubin, total 0.2 0.0 - 1.2 mg/dL Alk. phosphatase 67 39 - 117 IU/L  
 AST (SGOT) 18 0 - 40 IU/L  
 ALT (SGPT) 25 0 - 44 IU/L  
PSA, DIAGNOSTIC (PROSTATE SPECIFIC AG) Result Value Ref Range Prostate Specific Ag 3.4 0.0 - 4.0 ng/mL CBC W/O DIFF Result Value Ref Range WBC 5.0 3.4 - 10.8 x10E3/uL  
 RBC 4.65 4.14 - 5.80 x10E6/uL HGB 14.3 13.0 - 17.7 g/dL HCT 41.6 37.5 - 51.0 % MCV 90 79 - 97 fL  
 MCH 30.8 26.6 - 33.0 pg  
 MCHC 34.4 31.5 - 35.7 g/dL  
 RDW 14.0 12.3 - 15.4 % PLATELET 357 255 - 601 x10E3/uL URINALYSIS W/ RFLX MICROSCOPIC Result Value Ref Range Specific Gravity 1.010 1.005 - 1.030  
 pH (UA) 5.0 5.0 - 7.5 Color Yellow Yellow Appearance Clear Clear Leukocyte Esterase Negative Negative Protein Negative Negative/Trace Glucose Negative Negative Ketone Negative Negative Blood Negative Negative Bilirubin Negative Negative Urobilinogen 0.2 0.2 - 1.0 mg/dL Nitrites Negative Negative Microscopic Examination Comment VITAMIN D, 25 HYDROXY Result Value Ref Range VITAMIN D, 25-HYDROXY 13.4 (L) 30.0 - 100.0 ng/mL CVD REPORT Result Value Ref Range INTERPRETATION Note PDF IMAGE Not applicable Physical Exam 
Visit Vitals /80 (BP 1 Location: Right arm, BP Patient Position: Sitting) Pulse (!) 58 Temp 97.6 °F (36.4 °C) (Oral) Resp 16 Ht 5' 8\" (1.727 m) Wt 165 lb 12.8 oz (75.2 kg) SpO2 99% BMI 25.21 kg/m² Neck: supple, symmetrical, trachea midline, no adenopathy, thyroid: not enlarged, symmetric, no tenderness/mass/nodules, no carotid bruit and no JVD Lungs: Increased E to I ratios with normal breathing. No wheezing with forced expiration. Heart: regular rate and rhythm, S1, S2 normal, no murmur, click, rub or gallop Chest wall: Little discomfort to palpation, but his left anterior chest is uncomfortable with a deep breath, and brings on the same discomfort that he came in for. Neurologic: Grossly normal 
 
 
ASSESSMENT and PLAN 
  ICD-10-CM ICD-9-CM 1. Chest pain, unspecified type R07.9 786.50 AMB POC EKG ROUTINE W/ 12 LEADS, INTER & REP 2. Elevated triglycerides with high cholesterol E78.2 272.2 3. Vitamin D deficiency E55.9 268.9 4. Allergic rhinitis caused by mold J30.89 477.8   
 nasal congestion domingo when wake up in AM  
 
Diagnoses and all orders for this visit: 
 
1. Chest pain, unspecified type -     AMB POC EKG ROUTINE W/ 12 LEADS, INTER & REP 2. Elevated triglycerides with high cholesterol 3. Vitamin D deficiency 4. Allergic rhinitis caused by mold Comments: 
nasal congestion domingo when wake up in AM 
 
Other orders 
-     albuterol (PROVENTIL HFA, VENTOLIN HFA, PROAIR HFA) 90 mcg/actuation inhaler; Take 1 Puff by inhalation every six (6) hours as needed for Shortness of Breath. Follow-up Disposition: 
Return if symptoms worsen or fail to improve. reviewed medications and side effects in detail Please call my office if there are any questions- [phone]. Discussed expected course/resolution/complications of diagnosis in detail with patient. Medication risks/benefits/costs/interactions/alternatives discussed with patient. Pt was given an after visit summary which includes diagnoses, current medications & vitals. Pt expressed understanding with the diagnosis and plan. Total 40 minutes,60 % counseling re: Went into detail how to separate cardiac from non cardiac chest pain, by history of the pain, aggravating factors especially aggravation with exertion,etc.  
 
Recommended a weekly \"heart check. \" I went into detail how to do this. Regular exercise is very important to your health; it helps mentally, physically, socially; it prevents injuries if done properly.   Exercise, even as simple as walking 20-30 minutes daily has major benefits to your health even though your \"numbers\" are the same in the lab. See if you can add this into your daily regimen and after a few months it will become a regular habit-\"just something you do,\" like brushing your teeth. A combination of aerobic exercise and strengthening and stretching is felt to be the best for you, so this should be your ultimate goal.  
This can be done in the privacy of your home or in a group setting as at the gym  Some prefer having a , others prefer to do exercise in groups or individually. Do what \"works\" for you. You need to make it simple and \"fun,\" or you most likely you will not continue it. He asked if stress could be causing this as there is major stress at work for the next 4-6 mths due to a deadline on a project he is involved in. I informed pt that his discomfort does not appear to be cardiac in nature, as it is not aggravated by swimming, but is aggravated by deep breathing. We will give him a trial of an albuterol inhaler. If this does not give him any relief within the first 2-3 uses, I told him to stop using it. I recommended an antihistamine of choice OTC- claritin, Allegra, or Zyrtec daily w/o a decongestant for treatment of allergic rhinitis. Add Swallow without inhaling and without use of a spacing device. Do not eat or drink for 30 minutes after taking the medication. I encouraged daily Claritin use. Because of significant nasal congestion, R>L, I recommended daily use of Flonase bilaterally and encouraged \"the sniff test\" first thing in the AM to evaluate his different allergic rhinitis treatments effectiveness. Also, discussed symptoms of concern that were noted today in the note above, treatment options( including doing nothing), when to follow up before recommended time frame. Also, answered all questions.  
 
This document was written by Reema Smallwood, as dictated by Bere Starr MD. 
I have reviewed and agree with the above note and have made corrections where appropriate Twan Kearney M.D.

## 2019-01-29 NOTE — PROGRESS NOTES
Luma Tiwari is a 52 y.o. male Chief Complaint Patient presents with  Chills  Breathing Problem  
  started about 2 wks ago  Chest Pain  
  started last week. Health Maintenance Due Topic Date Due  Shingrix Vaccine Age 50> (1 of 2) 09/29/2013 1. Have you been to the ER, urgent care clinic since your last visit? Hospitalized since your last visit? No 
 
2. Have you seen or consulted any other health care providers outside of the [address] [place] [address] since your last visit? Include any pap smears or colon screening. No 
 
Visit Vitals /80 (BP 1 Location: Right arm, BP Patient Position: Sitting) Pulse (!) 58 Temp 97.6 °F (36.4 °C) (Oral) Resp 16 Ht 5' 8\" (1.727 m) Wt 165 lb 12.8 oz (75.2 kg) SpO2 99% BMI 25.21 kg/m²

## 2019-01-30 ENCOUNTER — TELEPHONE (OUTPATIENT)
Dept: FAMILY MEDICINE CLINIC | Age: 54
End: 2019-01-30

## 2019-01-30 NOTE — TELEPHONE ENCOUNTER
Pt. Calling requesting a call back in regards to schedule an annual physical sooner with Dr. Jamshid Luciano.      Best call #[phone]

## 2019-07-19 ENCOUNTER — OFFICE VISIT (OUTPATIENT)
Dept: FAMILY MEDICINE CLINIC | Age: 54
End: 2019-07-19

## 2019-07-19 VITALS
HEIGHT: 68 IN | OXYGEN SATURATION: 98 % | RESPIRATION RATE: 18 BRPM | DIASTOLIC BLOOD PRESSURE: 80 MMHG | WEIGHT: 165 LBS | SYSTOLIC BLOOD PRESSURE: 110 MMHG | HEART RATE: 63 BPM | BODY MASS INDEX: 25.01 KG/M2 | TEMPERATURE: 97.4 F

## 2019-07-19 DIAGNOSIS — J30.89 ALLERGIC RHINITIS CAUSED BY MOLD: ICD-10-CM

## 2019-07-19 DIAGNOSIS — R35.1 NOCTURIA: ICD-10-CM

## 2019-07-19 DIAGNOSIS — Z00.00 PHYSICAL EXAM: Primary | ICD-10-CM

## 2019-07-19 DIAGNOSIS — E55.9 VITAMIN D DEFICIENCY: ICD-10-CM

## 2019-07-19 NOTE — PROGRESS NOTES
HISTORY OF PRESENT ILLNESS  HPI  Marge Rojas is a 52 y.o. Male( from [place]) with a history of low back pain radiating to right lower extremity, hepatitis (1985) and vitamin D deficiency, who presents to the office today for a complete physical. Pt reports he is regularly active with no troubles. Pt denies unusual SOB, chest pain, and any recent ER visits or hospitalizations. Past Medical History:   Diagnosis Date    Allergic rhinitis     Allergic rhinitis caused by mold 5/13/2018    History of hepatitis A 1995    Hep B & C neg 2014; hepatitis A IgM-, IgG+    Vitamin D deficiency      History reviewed. No pertinent surgical history. Current Outpatient Medications on File Prior to Visit   Medication Sig Dispense Refill    albuterol (PROVENTIL HFA, VENTOLIN HFA, PROAIR HFA) 90 mcg/actuation inhaler Take 1 Puff by inhalation every six (6) hours as needed for Shortness of Breath. 1 Inhaler 11    loratadine (CLARITIN) 10 mg tablet Take 1 Tab by mouth daily. 30 Tab prn    ibuprofen (ADVIL) 200 mg tablet Take 3-4 Tabs by mouth every six (6) hours as needed for Pain. No current facility-administered medications on file prior to visit.       Allergies   Allergen Reactions    Naprosyn [Naproxen] Other (comments)     dizziness     Family History   Problem Relation Age of Onset    No Known Problems Father     Hypertension Mother     Stroke Mother 48    Other Paternal Grandmother 80        age   24 [facility] Heart Attack Paternal Grandfather         unclear age   24 [facility] St[facility] Maternal Grandmother 79    Other Maternal Grandfather         WWII     Social History     Socioeconomic History    Marital status: SINGLE     Spouse name: Not on file    Number of children: Not on file    Years of education: Not on file    Highest education level: Not on file   Tobacco Use    Smoking status: Never Smoker    Smokeless tobacco: Never Used   Substance and Sexual Activity    Alcohol use: Yes     Comment: maybe 1-2 drinks per 2 weeks    Drug use: No    Sexual activity: Yes             Review of Systems   Constitutional: Negative for chills, diaphoresis, fever, malaise/fatigue and weight loss. HENT: Negative for congestion, ear discharge, ear pain, hearing loss, nosebleeds, sore throat and tinnitus. Eyes: Negative for blurred vision, double vision, photophobia, pain, discharge and redness. Respiratory: Negative for cough, hemoptysis, sputum production, shortness of breath, wheezing and stridor. Cardiovascular: Negative for chest pain, palpitations, orthopnea, claudication, leg swelling and PND. Gastrointestinal: Negative for abdominal pain, blood in stool, constipation, diarrhea, heartburn, melena, nausea and vomiting. Genitourinary: Negative for dysuria, flank pain, frequency, hematuria and urgency. Musculoskeletal: Negative for back pain, falls, joint pain, myalgias and neck pain. Skin: Negative for itching and rash. Neurological: Negative for dizziness, tingling, tremors, sensory change, speech change, focal weakness, seizures, loss of consciousness, weakness and headaches. Endo/Heme/Allergies: Negative for environmental allergies and polydipsia. Does not bruise/bleed easily. Psychiatric/Behavioral: Negative for depression, hallucinations, memory loss, substance abuse and suicidal ideas. The patient is not nervous/anxious and does not have insomnia.       Results for orders placed or performed in visit on 05/07/18   LIPID PANEL   Result Value Ref Range    Cholesterol, total 259 (H) 100 - 199 mg/dL    Triglyceride 753 (HH) 0 - 149 mg/dL    HDL Cholesterol 32 (L) >39 mg/dL    VLDL, calculated Comment 5 - 40 mg/dL    LDL, calculated Comment 0 - 99 mg/dL   METABOLIC PANEL, COMPREHENSIVE   Result Value Ref Range    Glucose 89 65 - 99 mg/dL    BUN 15 6 - 24 mg/dL    Creatinine 0.85 0.76 - 1.27 mg/dL    GFR est non-AA 99 >59 mL/min/1.73    GFR est  >59 mL/min/1.73    BUN/Creatinine ratio 18 9 - 20 Sodium 139 134 - 144 mmol/L    Potassium 4.9 3.5 - 5.2 mmol/L    Chloride 103 96 - 106 mmol/L    CO2 23 18 - 29 mmol/L    Calcium 9.2 8.7 - 10.2 mg/dL    Protein, total 6.9 6.0 - 8.5 g/dL    Albumin 4.6 3.5 - 5.5 g/dL    GLOBULIN, TOTAL 2.3 1.5 - 4.5 g/dL    A-G Ratio 2.0 1.2 - 2.2    Bilirubin, total 0.2 0.0 - 1.2 mg/dL    Alk. phosphatase 67 39 - 117 IU/L    AST (SGOT) 18 0 - 40 IU/L    ALT (SGPT) 25 0 - 44 IU/L   PSA, DIAGNOSTIC (PROSTATE SPECIFIC AG)   Result Value Ref Range    Prostate Specific Ag 3.4 0.0 - 4.0 ng/mL   CBC W/O DIFF   Result Value Ref Range    WBC 5.0 3.4 - 10.8 x10E3/uL    RBC 4.65 4.14 - 5.80 x10E6/uL    HGB 14.3 13.0 - 17.7 g/dL    HCT 41.6 37.5 - 51.0 %    MCV 90 79 - 97 fL    MCH 30.8 26.6 - 33.0 pg    MCHC 34.4 31.5 - 35.7 g/dL    RDW 14.0 12.3 - 15.4 %    PLATELET 897 638 - 284 x10E3/uL   URINALYSIS W/ RFLX MICROSCOPIC   Result Value Ref Range    Specific Gravity 1.010 1.005 - 1.030    pH (UA) 5.0 5.0 - 7.5    Color Yellow Yellow    Appearance Clear Clear    Leukocyte Esterase Negative Negative    Protein Negative Negative/Trace    Glucose Negative Negative    Ketone Negative Negative    Blood Negative Negative    Bilirubin Negative Negative    Urobilinogen 0.2 0.2 - 1.0 mg/dL    Nitrites Negative Negative    Microscopic Examination Comment    VITAMIN D, 25 HYDROXY   Result Value Ref Range    VITAMIN D, 25-HYDROXY 13.4 (L) 30.0 - 100.0 ng/mL   CVD REPORT   Result Value Ref Range    INTERPRETATION Note     PDF IMAGE Not applicable          Physical Exam  Visit Vitals  /80 (BP 1 Location: Right arm, BP Patient Position: Sitting)   Pulse 63   Temp 97.4 °F (36.3 °C) (Oral)   Resp 18   Ht 5' 8\" (1.727 m)   Wt 165 lb (74.8 kg)   SpO2 98%   BMI 25.09 kg/m²       General:  Alert, cooperative, no distress, appears stated age. Head:  Normocephalic, without obvious abnormality, atraumatic. Eyes:  Conjunctivae/corneas clear. PERRL, EOMs intact.  Fundi benign   Ears:  Normal TMs and external ear canals both ears. Nose: Nares normal. Septum midline. Mucosa normal. No drainage or sinus tenderness. Throat: Lips, mucosa, and tongue normal. Teeth and gums normal.   Neck: Supple, symmetrical, trachea midline, no adenopathy, thyroid: no enlargement/tenderness/nodules, no carotid bruit and no JVD. Back:   Symmetric, no curvature. ROM normal. No CVA tenderness. Lungs:   Clear to auscultation bilaterally. Chest wall:  No tenderness or deformity. Heart:  Regular rate and rhythm, S1, S2 normal, no murmur, click, rub or gallop. Abdomen:   Soft, non-tender. Bowel sounds normal. No masses,  No organomegaly. Genitalia:  Normal male without lesion, discharge or tenderness. Rectal:  Normal tone, normal prostate, no masses or tenderness  Guaiac negative stool. Extremities: Extremities normal, atraumatic, no cyanosis or edema. Pulses: 2+ and symmetric all extremities. Skin: Skin color, texture, turgor normal. No rashes or lesions. Pt points out atypical seborrheic keratosis in left neck area measuring 1.25 cm in diameter (frozen in the past by dermatologist, resolved for a year and now came back). Lymph nodes: Cervical, supraclavicular, and axillary nodes normal.   Neurologic: CNII-XII intact. Normal strength, sensation and reflexes throughout. ASSESSMENT and PLAN    ICD-10-CM ICD-9-CM    1. Physical exam C35.29 W10.3 METABOLIC PANEL, COMPREHENSIVE      LIPID PANEL      CBC W/O DIFF      URINALYSIS W/ RFLX MICROSCOPIC   2. Nocturia R35.1 788.43 PSA W/ REFLX FREE PSA   3. Vitamin D deficiency E55.9 268.9    4. Allergic rhinitis caused by mold J30.89 477.8      Diagnoses and all orders for this visit:    1. Physical exam  -     METABOLIC PANEL, COMPREHENSIVE  -     LIPID PANEL  -     CBC W/O DIFF  -     URINALYSIS W/ RFLX MICROSCOPIC    2. Nocturia  -     PSA W/ REFLX FREE PSA    3. Vitamin D deficiency    4.  Allergic rhinitis caused by mold          lab results and schedule of future lab studies reviewed with patient  reviewed diet, exercise and weight control  cardiovascular risk and specific lipid/LDL goals reviewed  reviewed medications and side effects in detail  Please call my office if there are any questions- [phone]. I will arrange for follow up after the lab tests done from today return  Recommended a weekly \"heart check. \" I went into detail how to do this. Call for refills on medications as needed. Discussed expected course/resolution/complications of diagnosis in detail with patient. Medication risks/benefits/costs/interactions/alternatives discussed with patient. Pt was given an after visit summary which includes diagnoses, current medications & vitals. Pt expressed understanding with the diagnosis and plan. Regular exercise is very important to your health; it helps mentally, physically, socially; it prevents injuries if done properly. Exercise, even as simple as walking 20-30 minutes daily has major benefits to your health even though your \"numbers\" are the same in the lab. See if you can add this into your daily regimen and after a few months it will become a regular habit-\"just something you do,\" like brushing your teeth. A combination of aerobic exercise and strengthening and stretching is felt to be the best for you, so this should be your ultimate goal.   This can be done in the privacy of your home or in a group setting as at the gym  Some prefer having a , others prefer to do exercise in groups or individually. Do what \"works\" for you. You need to make it simple and \"fun,\" or you most likely you will not continue it. Recommended a weekly \"heart check. \" I went into detail how to do this. Encouraged pt to see an eye doctor at least once every 2 years for glaucoma and macular degeneration screening. Suggested he go back and see his dermatologist for evaluation/treatment of the lesion in the neck region.     This document was written by [facility] Telma Nazario, as dictated by Georgia Davis MD.  I have reviewed and agree with the above note and have made corrections where appropriate Twan Ribera M.D.

## 2019-07-19 NOTE — PROGRESS NOTES
Chief Complaint   Patient presents with   [facility] Annual Wellness Visit     pt not fasting      1. Have you been to the ER, urgent care clinic since your last visit? Hospitalized since your last visit? No    2. Have you seen or consulted any other health care providers outside of the [address] [place] [address] since your last visit? Include any pap smears or colon screening.  No

## 2019-07-20 ENCOUNTER — LAB ONLY (OUTPATIENT)
Dept: FAMILY MEDICINE CLINIC | Age: 54
End: 2019-07-20

## 2019-07-20 DIAGNOSIS — Z00.00 ROUTINE GENERAL MEDICAL EXAMINATION AT A HEALTH CARE FACILITY: Primary | ICD-10-CM

## 2019-07-21 LAB
ALBUMIN SERPL-MCNC: 4.7 G/DL (ref 3.5–5.5)
ALBUMIN/GLOB SERPL: 2 {RATIO} (ref 1.2–2.2)
ALP SERPL-CCNC: 65 IU/L (ref 39–117)
ALT SERPL-CCNC: 19 IU/L (ref 0–44)
APPEARANCE UR: CLEAR
AST SERPL-CCNC: 13 IU/L (ref 0–40)
BACTERIA #/AREA URNS HPF: NORMAL /[HPF]
BILIRUB SERPL-MCNC: 0.4 MG/DL (ref 0–1.2)
BILIRUB UR QL STRIP: NEGATIVE
BUN SERPL-MCNC: 13 MG/DL (ref 6–24)
BUN/CREAT SERPL: 12 (ref 9–20)
CALCIUM SERPL-MCNC: 9.5 MG/DL (ref 8.7–10.2)
CASTS URNS QL MICRO: NORMAL /LPF
CHLORIDE SERPL-SCNC: 104 MMOL/L (ref 96–106)
CHOLEST SERPL-MCNC: 255 MG/DL (ref 100–199)
CO2 SERPL-SCNC: 23 MMOL/L (ref 20–29)
COLOR UR: YELLOW
CREAT SERPL-MCNC: 1.08 MG/DL (ref 0.76–1.27)
EPI CELLS #/AREA URNS HPF: NORMAL /HPF (ref 0–10)
ERYTHROCYTE [DISTWIDTH] IN BLOOD BY AUTOMATED COUNT: 14.4 % (ref 12.3–15.4)
GLOBULIN SER CALC-MCNC: 2.3 G/DL (ref 1.5–4.5)
GLUCOSE SERPL-MCNC: 85 MG/DL (ref 65–99)
GLUCOSE UR QL: NEGATIVE
HCT VFR BLD AUTO: 43.6 % (ref 37.5–51)
HDLC SERPL-MCNC: 37 MG/DL
HGB BLD-MCNC: 14.8 G/DL (ref 13–17.7)
HGB UR QL STRIP: NEGATIVE
INTERPRETATION, 910389: NORMAL
KETONES UR QL STRIP: NEGATIVE
LDLC SERPL CALC-MCNC: ABNORMAL MG/DL (ref 0–99)
LEUKOCYTE ESTERASE UR QL STRIP: ABNORMAL
MCH RBC QN AUTO: 30.2 PG (ref 26.6–33)
MCHC RBC AUTO-ENTMCNC: 33.9 G/DL (ref 31.5–35.7)
MCV RBC AUTO: 89 FL (ref 79–97)
MICRO URNS: ABNORMAL
MUCOUS THREADS URNS QL MICRO: PRESENT
NITRITE UR QL STRIP: NEGATIVE
PDF IMAGE, 910387: NORMAL
PH UR STRIP: 5.5 [PH] (ref 5–7.5)
PLATELET # BLD AUTO: 239 X10E3/UL (ref 150–450)
POTASSIUM SERPL-SCNC: 4.4 MMOL/L (ref 3.5–5.2)
PROT SERPL-MCNC: 7 G/DL (ref 6–8.5)
PROT UR QL STRIP: NEGATIVE
PSA FREE MFR SERPL: 17.4 %
PSA FREE SERPL-MCNC: 0.82 NG/ML
PSA SERPL-MCNC: 4.7 NG/ML (ref 0–4)
RBC # BLD AUTO: 4.9 X10E6/UL (ref 4.14–5.8)
RBC #/AREA URNS HPF: NORMAL /HPF (ref 0–2)
REFLEX CRITERIA: ABNORMAL
SODIUM SERPL-SCNC: 141 MMOL/L (ref 134–144)
SP GR UR: 1.02 (ref 1–1.03)
TRIGL SERPL-MCNC: 559 MG/DL (ref 0–149)
UROBILINOGEN UR STRIP-MCNC: 0.2 MG/DL (ref 0.2–1)
VLDLC SERPL CALC-MCNC: ABNORMAL MG/DL (ref 5–40)
WBC # BLD AUTO: 6.9 X10E3/UL (ref 3.4–10.8)
WBC #/AREA URNS HPF: NORMAL /HPF (ref 0–5)

## 2019-07-21 NOTE — PROGRESS NOTES
As you can see above, your lab tests done recently at your physical all came back normal except for the triglycerides; they are better than last year but still significantly elevated. @ 559. No signs of diabetes, normal liver and kidney function. I would recommend that you follow up for a full physical every 2 years until the age of 59, and then every year. The years that you are not having a physical, you will need a short appointment for a prostate exam ; your PSA (prostate cancer check) came back normal, but since it is increasing, I want to recheck that in 3 months with an office visit and to recheck the triglycerides and to discuss treatment of the triglycerides( main treatment is keeping the weight down and doing aerobic exercise). That should be a fasting office visit( nothing but non caloric liquids( water, black coffee or tea) for 14 hrs prior to test).

## 2019-07-24 ENCOUNTER — TELEPHONE (OUTPATIENT)
Dept: FAMILY MEDICINE CLINIC | Age: 54
End: 2019-07-24

## 2019-07-24 NOTE — TELEPHONE ENCOUNTER
----- Message from Meena Cardenas sent at 2019 12:48 PM EDT -----  Regarding: Dr. dunn/Florentin  Contact: [phone]  No avail appts  Appointment Request From: Niurka Hsu    With Provider: Mitul Silverio MD [facility]    Preferred Date Range: 10/28/2019 - 10/31/2019    Preferred Times: Any time    Reason for visit: Request an Appointment    Comments:  Doctor Trevon Neri recommended to schedule an appointment in 3 months for the prostate examination.      Outbound call to pt,  verified, s/h for  2019 10:15 AM

## 2019-10-29 ENCOUNTER — TELEPHONE (OUTPATIENT)
Dept: FAMILY MEDICINE CLINIC | Age: 54
End: 2019-10-29

## 2019-10-29 NOTE — TELEPHONE ENCOUNTER
----- Message from Yadira Funguel sent at 10/28/2019  6:35 PM EDT -----  Regarding: Visit Follow-Up Question  Contact: [phone]  I have an appointment with doctor Saman Cam on November 13. I would like to do blood test before my appointment. Please let me know if I need to have the test before the appointment and how it works?   Scotty Lutz

## 2019-10-30 DIAGNOSIS — E78.2 ELEVATED TRIGLYCERIDES WITH HIGH CHOLESTEROL: Primary | ICD-10-CM

## 2019-10-30 DIAGNOSIS — E55.9 VITAMIN D DEFICIENCY: ICD-10-CM

## 2019-10-30 DIAGNOSIS — R97.20 PSA ELEVATION: ICD-10-CM

## 2019-11-07 LAB
25(OH)D3+25(OH)D2 SERPL-MCNC: 12.4 NG/ML (ref 30–100)
CHOLEST SERPL-MCNC: 246 MG/DL (ref 100–199)
HDLC SERPL-MCNC: 36 MG/DL
INTERPRETATION, 910389: NORMAL
LDLC SERPL CALC-MCNC: ABNORMAL MG/DL (ref 0–99)
LDLC SERPL DIRECT ASSAY-MCNC: 99 MG/DL (ref 0–99)
PSA SERPL-MCNC: 3.7 NG/ML (ref 0–4)
REFLEX CRITERIA: NORMAL
TRIGL SERPL-MCNC: 475 MG/DL (ref 0–149)
VLDLC SERPL CALC-MCNC: ABNORMAL MG/DL (ref 5–40)

## 2019-11-13 ENCOUNTER — OFFICE VISIT (OUTPATIENT)
Dept: FAMILY MEDICINE CLINIC | Age: 54
End: 2019-11-13

## 2019-11-13 VITALS
BODY MASS INDEX: 25.46 KG/M2 | RESPIRATION RATE: 18 BRPM | DIASTOLIC BLOOD PRESSURE: 60 MMHG | TEMPERATURE: 98.1 F | HEIGHT: 68 IN | WEIGHT: 168 LBS | SYSTOLIC BLOOD PRESSURE: 110 MMHG | OXYGEN SATURATION: 98 % | HEART RATE: 53 BPM

## 2019-11-13 DIAGNOSIS — R09.82 PND (POST-NASAL DRIP): ICD-10-CM

## 2019-11-13 DIAGNOSIS — R97.20 PSA ELEVATION: Primary | ICD-10-CM

## 2019-11-13 DIAGNOSIS — J30.89 ALLERGIC RHINITIS CAUSED BY MOLD: ICD-10-CM

## 2019-11-13 DIAGNOSIS — E55.9 VITAMIN D DEFICIENCY: ICD-10-CM

## 2019-11-13 DIAGNOSIS — E78.2 ELEVATED TRIGLYCERIDES WITH HIGH CHOLESTEROL: ICD-10-CM

## 2019-11-13 NOTE — PROGRESS NOTES
Chief Complaint   Patient presents with    Elevated PSA     3 month prostate exam     1. Have you been to the ER, urgent care clinic since your last visit? Hospitalized since your last visit? No    2. Have you seen or consulted any other health care providers outside of the [address] [place] [address] since your last visit? Include any pap smears or colon screening.  No

## 2019-11-13 NOTE — PROGRESS NOTES
HISTORY OF PRESENT ILLNESS  HPI  Chiara Zepeda is a 62 y.o. Male with a history of low back pain radiating to right lower extremity, hepatitis (1985) and vitamin D deficiency, who presents to the office today for an elevated PSA. He also has marked elevation of the triglyceride  Pt denies having pancreatitis before. Pt denies family history of pancreatitis. Pt notes his grandfather had heart troubles starting at age 59. Pt mentions he has a few drinks a week at most, usually   Pt notes watching his diet more recently, eating less bread and sweets. Pt denies unusual SOB, chest pain, and any recent ER visits or hospitalizations. Has been recently diagnosed with Vitamin D Def but is not taking Vit D supplement as directed- asked if he coud get enough in the diet and I encouraged him to use supplement until normal and then try to keep it at a good level with diet and\"sun\" exposure. If it drifts downward, then restart supplement. Past Medical History:   Diagnosis Date    Allergic rhinitis     Allergic rhinitis caused by mold 5/13/2018    History of hepatitis A 1995    Hep B & C neg 2014; hepatitis A IgM-, IgG+    Vitamin D deficiency      History reviewed. No pertinent surgical history.   Current Outpatient Medications on File Prior to Visit   Medication Sig Dispense Refill    albuterol (PROVENTIL HFA, VENTOLIN HFA, PROAIR HFA) 90 mcg/actuation inhaler Take 1 Puff by inhalation every six (6) hours as needed for Shortness of Breath. 1 Inhaler 11    loratadine (CLARITIN) 10 mg tablet Take 1 Tab by mouth daily. 30 Tab prn    ibuprofen (ADVIL) 200 mg tablet Take 3-4 Tabs by mouth every six (6) hours as needed for Pain. No current facility-administered medications on file prior to visit. Allergies   Allergen Reactions    Naprosyn [Naproxen] Other (comments)     dizziness     Family History   Problem Relation Age of Onset    No Known Problems Father     Hypertension Mother     Stroke Mother 48    Other Paternal Grandmother 80        age   [facility] Heart Attack Paternal Grandfather         unclear age   [facility] Stroke Maternal Grandmother 79    Other Maternal Grandfather         WWII     Social History     Socioeconomic History    Marital status: SINGLE     Spouse name: Not on file    Number of children: Not on file    Years of education: Not on file    Highest education level: Not on file   Tobacco Use    Smoking status: Never Smoker    Smokeless tobacco: Never Used   Substance and Sexual Activity    Alcohol use: Yes     Comment: maybe 1-2 drinks per 2 weeks    Drug use: No    Sexual activity: Yes             Review of Systems   Constitutional: Negative for chills, diaphoresis, fever, malaise/fatigue and weight loss. Eyes: Negative for blurred vision, double vision, pain and redness. Respiratory: Negative for cough, shortness of breath and wheezing. Cardiovascular: Negative for chest pain, palpitations, orthopnea, claudication, leg swelling and PND. Skin: Negative for itching and rash. Neurological: Negative for dizziness, tingling, tremors, sensory change, speech change, focal weakness, seizures, loss of consciousness, weakness and headaches.      Results for orders placed or performed in visit on 10/30/19   LIPID PANEL   Result Value Ref Range    Cholesterol, total 246 (H) 100 - 199 mg/dL    Triglyceride 475 (H) 0 - 149 mg/dL    HDL Cholesterol 36 (L) >39 mg/dL    VLDL, calculated Comment 5 - 40 mg/dL    LDL, calculated Comment 0 - 99 mg/dL   LDL, DIRECT   Result Value Ref Range    LDL,Direct 99 0 - 99 mg/dL   PSA W/ REFLX FREE PSA   Result Value Ref Range    Prostate Specific Ag 3.7 0.0 - 4.0 ng/mL    Reflex Criteria Comment    VITAMIN D, 25 HYDROXY   Result Value Ref Range    VITAMIN D, 25-HYDROXY 12.4 (L) 30.0 - 100.0 ng/mL   CVD REPORT   Result Value Ref Range    INTERPRETATION Note          Physical Exam  Visit Vitals  /60 (BP 1 Location: Left arm, BP Patient Position: Sitting)   Pulse (!) 53   Temp 98.1 °F (36.7 °C) (Oral)   Resp 18   Ht 5' 8\" (1.727 m)   Wt 168 lb (76.2 kg)   SpO2 98%   BMI 25.54 kg/m²       Physical exam deferred. ASSESSMENT and PLAN    ICD-10-CM ICD-9-CM    1. PSA elevation R97.20 790.93    2. PND (post-nasal drip) R09.82 784.91    3. Allergic rhinitis caused by mold J30.89 477.8    4. Vitamin D deficiency E55.9 268.9    5. Elevated triglycerides with high cholesterol E78.2 272.2      Diagnoses and all orders for this visit:    1. PSA elevation    2. PND (post-nasal drip)    3. Allergic rhinitis caused by mold    4. Vitamin D deficiency    5. Elevated triglycerides with high cholesterol      Follow-up and Dispositions    · Return in about 6 months (around 5/13/2020), or if allergic rhinitis symptoms worsen or fail to improve, for recheck PSA, allergic rhinitis and nasal congestion. lab results and schedule of future lab studies reviewed with patient  reviewed diet, exercise and weight control  cardiovascular risk and specific lipid/LDL goals reviewed  reviewed medications and side effects in detail  Please call my office if there are any questions- [phone]. I will arrange for follow up after the lab tests done from today return  Recommended a weekly \"heart check. \" I went into detail how to do this. Call for refills on medications as needed.   Discussed expected course/resolution/complications of diagnosis in detail with patient. Medication risks/benefits/costs/interactions/alternatives discussed with patient. Pt was given an after visit summary which includes diagnoses, current medications & vitals. Pt expressed understanding with the diagnosis and plan. Total 25 minutes,60 % counseling re:   Reviewed his labs that were repeated last week and his PSA is better. Will repeat that in 6 months, his vitamin D is very low at 12.4 so I encouraged him to start taking 2000 units of vitamin D and after a week, if tolerated, increase it to two a day= 4000 units of Vitamin D and stay on that until we tell him to stop. For his triglycerides, he's going to continue following a diet low in sweets and carbohydrates, with minimal alcohol. Pt will try fish oil once again (had troubles with heartburn with it in the past). Suggested he gradually increase up to 4000 mg of fish oil, or whatever dose he can tolerate and stay on that until we recheck things in 6 months. For the posterior nasal drainage I recommended an antihistamine of choice OTC- claritin, Allegra, or Zyrtec daily w/o a decongestant for treatment of allergic rhinitis. Saline nose spray regularly, at least 4 times a day and if no better. Add nasal steroid OTC. Reviewed options and ways to lower costs. Continue with diet, weight control, and start fish oil capsules ( OTC about 800-1000 mgs each ); attempt to increase to 2 twice a day, adding one extra capsule each week, until you are at that dose in 4 weeks. After you have been on that dose for four weeks, make a fasting office visit and we can review things, recheck the cholesterol profile and go from there. He has tried this years ago and had a fishy taste in his mouth. Also, discussed symptoms of concern that were noted today in the note above, treatment options( including doing nothing), when to follow up before recommended time frame. Also, answered all questions.       This document was written by Carole Wilder Non Medico, as dictated by Alecia Santizo MD.  I have reviewed and agree with the above note and have made corrections where appropriate Twan Lopez M.D.

## 2020-10-07 ENCOUNTER — OFFICE VISIT (OUTPATIENT)
Dept: FAMILY MEDICINE CLINIC | Age: 55
End: 2020-10-07
Payer: COMMERCIAL

## 2020-10-07 VITALS
DIASTOLIC BLOOD PRESSURE: 72 MMHG | OXYGEN SATURATION: 99 % | WEIGHT: 165.6 LBS | HEIGHT: 68 IN | SYSTOLIC BLOOD PRESSURE: 120 MMHG | TEMPERATURE: 97.8 F | HEART RATE: 65 BPM | BODY MASS INDEX: 25.1 KG/M2

## 2020-10-07 DIAGNOSIS — E78.2 ELEVATED TRIGLYCERIDES WITH HIGH CHOLESTEROL: Primary | ICD-10-CM

## 2020-10-07 DIAGNOSIS — R97.20 INCREASED PROSTATE SPECIFIC ANTIGEN (PSA) VELOCITY: ICD-10-CM

## 2020-10-07 DIAGNOSIS — E55.9 VITAMIN D DEFICIENCY: ICD-10-CM

## 2020-10-07 DIAGNOSIS — Z00.00 ROUTINE GENERAL MEDICAL EXAMINATION AT A HEALTH CARE FACILITY: ICD-10-CM

## 2020-10-07 DIAGNOSIS — J30.89 ALLERGIC RHINITIS CAUSED BY MOLD: ICD-10-CM

## 2020-10-07 DIAGNOSIS — R97.20 PSA ELEVATION: ICD-10-CM

## 2020-10-07 DIAGNOSIS — R09.82 PND (POST-NASAL DRIP): ICD-10-CM

## 2020-10-07 PROCEDURE — 99214 OFFICE O/P EST MOD 30 MIN: CPT | Performed by: FAMILY MEDICINE

## 2020-10-07 NOTE — PROGRESS NOTES
HISTORY OF PRESENT ILLNESS  HPI  Rob Villa is a 60 y.o. Male with a history of low back pain radiating to right lower extremity, hepatitis (1985) and vitamin D deficiency, and elevated trig/chol who presents to the office today for a physical. Pt is fasting. He has had an elevated PSA in the past that receded without treatment. Pt is regularly active. He believes that he takes 1,000 units of vitamin D daily. He inquires about COVID-19 testing. Pt denies unusual SOB, chest pain, and any recent ER visits or hospitalizations. Past Medical History:   Diagnosis Date    Allergic rhinitis     Allergic rhinitis caused by mold 5/13/2018    History of hepatitis A 1995    Hep B & C neg 2014; hepatitis A IgM-, IgG+    Vitamin D deficiency      History reviewed. No pertinent surgical history. Current Outpatient Medications on File Prior to Visit   Medication Sig Dispense Refill    albuterol (PROVENTIL HFA, VENTOLIN HFA, PROAIR HFA) 90 mcg/actuation inhaler Take 1 Puff by inhalation every six (6) hours as needed for Shortness of Breath. 1 Inhaler 11    loratadine (CLARITIN) 10 mg tablet Take 10 mg by mouth daily as needed. 30 Tab prn    ibuprofen (ADVIL) 200 mg tablet Take 3-4 Tabs by mouth every six (6) hours as needed for Pain. No current facility-administered medications on file prior to visit.       Allergies   Allergen Reactions    Naprosyn [Naproxen] Other (comments)     dizziness     Family History   Problem Relation Age of Onset    No Known Problems Father     Hypertension Mother     Stroke Mother 48    Other Paternal Grandmother 80        age   [facility] Heart Attack Paternal Grandfather         unclear age   [facility] Stroke Maternal Grandmother 79    Other Maternal Grandfather         WWII     Social History     Socioeconomic History    Marital status: SINGLE     Spouse name: Not on file    Number of children: Not on file    Years of education: Not on file    Highest education level: Not on file   Tobacco Use    Smoking status: Never Smoker    Smokeless tobacco: Never Used   Substance and Sexual Activity    Alcohol use: Yes     Comment: maybe 1-2 drinks per 2 weeks    Drug use: No    Sexual activity: Yes             Review of Systems   Constitutional: Negative for chills, diaphoresis, fever, malaise/fatigue and weight loss. Eyes: Negative for blurred vision, double vision, pain and redness. Respiratory: Negative for cough, shortness of breath and wheezing. Cardiovascular: Negative for chest pain, palpitations, orthopnea, claudication, leg swelling and PND. Skin: Negative for itching and rash. Neurological: Negative for dizziness, tingling, tremors, sensory change, speech change, focal weakness, seizures, loss of consciousness, weakness and headaches. Results for orders placed or performed in visit on 27/00/40   METABOLIC PANEL, COMPREHENSIVE   Result Value Ref Range    Glucose 93 65 - 99 mg/dL    BUN 15 6 - 24 mg/dL    Creatinine 0.95 0.76 - 1.27 mg/dL    GFR est non-AA 88 >59 mL/min/1.73    GFR est  >59 mL/min/1.73    BUN/Creatinine ratio 16 9 - 20    Sodium 141 134 - 144 mmol/L    Potassium 4.7 3.5 - 5.2 mmol/L    Chloride 104 96 - 106 mmol/L    CO2 22 20 - 29 mmol/L    Calcium 9.3 8.7 - 10.2 mg/dL    Protein, total 6.8 6.0 - 8.5 g/dL    Albumin 4.7 3.8 - 4.9 g/dL    GLOBULIN, TOTAL 2.1 1.5 - 4.5 g/dL    A-G Ratio 2.2 1.2 - 2.2    Bilirubin, total 0.3 0.0 - 1.2 mg/dL    Alk.  phosphatase 64 39 - 117 IU/L    AST (SGOT) 17 0 - 40 IU/L    ALT (SGPT) 21 0 - 44 IU/L   LIPID PANEL   Result Value Ref Range    Cholesterol, total 221 (H) 100 - 199 mg/dL    Triglyceride 265 (H) 0 - 149 mg/dL    HDL Cholesterol 42 >39 mg/dL    VLDL Cholesterol Todd 47 (H) 5 - 40 mg/dL    LDL Chol Calc (NIH) 132 (H) 0 - 99 mg/dL   VITAMIN D, 25 HYDROXY   Result Value Ref Range    VITAMIN D, 25-HYDROXY 18.5 (L) 30.0 - 100.0 ng/mL   PSA W/ REFLX FREE PSA   Result Value Ref Range    Prostate Specific Ag 4.6 (H) 0.0 - 4.0 ng/mL    Reflex Criteria Comment    PSA, FREE   Result Value Ref Range    PSA, Free 0.74 N/A ng/mL    % Free PSA 16.1 %   CVD REPORT   Result Value Ref Range    INTERPRETATION Note            Physical Exam  Visit Vitals  /72 (BP 1 Location: Right arm, BP Patient Position: Sitting)   Pulse 65   Temp 97.8 °F (36.6 °C) (Temporal)   Ht 5' 8\" (1.727 m)   Wt 165 lb 9.6 oz (75.1 kg)   SpO2 99%   BMI 25.18 kg/m²           Head: Normocephalic, without obvious abnormality, atraumatic  Eyes: conjunctivae/corneas clear. PERRL, EOM's intact. Neck: supple, symmetrical, trachea midline, no adenopathy, thyroid: not enlarged, symmetric, no tenderness/mass/nodules, no carotid bruit and no JVD  Lungs: clear to auscultation bilaterally  Heart: regular rate and rhythm, S1, S2 normal, no murmur, click, rub or gallop  Extremities: extremities normal, atraumatic, no cyanosis or edema  Pulses: 2+ and symmetric  Lymph nodes: Cervical, supraclavicular, and axillary nodes normal.  Neurologic: Grossly normal          ASSESSMENT and PLAN    ICD-10-CM ICD-9-CM    1. Elevated triglycerides with high cholesterol  F41.5 361.3 METABOLIC PANEL, COMPREHENSIVE      LIPID PANEL   2. Increased prostate specific antigen (PSA) velocity  R97.20 790.93 PSA W/ REFLX FREE PSA      CANCELED: PSA W/ REFLX FREE PSA   3. Vitamin D deficiency  E55.9 268.9 VITAMIN D, 25 HYDROXY      VITAMIN D, 25 HYDROXY   4. Allergic rhinitis caused by mold  J30.89 477.8    5. PND (post-nasal drip)  R09.82 784.91    6. PSA elevation  R97.20 790.93    7. Routine general medical examination at a health care facility  Y69.83 I28.1 METABOLIC PANEL, COMPREHENSIVE      LIPID PANEL      VITAMIN D, 25 HYDROXY      VITAMIN D, 25 HYDROXY      PSA W/ REFLX FREE PSA      PSA, FREE      CVD REPORT     Diagnoses and all orders for this visit:    1. Elevated triglycerides with high cholesterol  -     METABOLIC PANEL, COMPREHENSIVE  -     LIPID PANEL    2.  Increased prostate specific antigen (PSA) velocity  -     PSA W/ REFLX FREE PSA    3. Vitamin D deficiency  -     VITAMIN D, 25 HYDROXY  -     VITAMIN D, 25 HYDROXY    4. Allergic rhinitis caused by mold    5. PND (post-nasal drip)    6. PSA elevation    7. Routine general medical examination at a health care facility  -     METABOLIC PANEL, COMPREHENSIVE  -     LIPID PANEL  -     VITAMIN D, 25 HYDROXY  -     VITAMIN D, 25 HYDROXY  -     PSA W/ REFLX FREE PSA  -     PSA, FREE  -     CVD REPORT      Follow-up and Dispositions    · Return in about 6 months (around 4/7/2021) for F/U cholesterol, allergic rhinitis, f/u Vitamin D deficiency. lab results and schedule of future lab studies reviewed with patient  reviewed diet, exercise and weight control  cardiovascular risk and specific lipid/LDL goals reviewed  reviewed medications and side effects in detail  Please call my office if there are any questions- [phone]. I will arrange for follow up after the lab tests done from today return  Recommended a weekly \"heart check. \" I went into detail how to do this. Call for refills on medications as needed. Discussed expected course/resolution/complications of diagnosis in detail with patient. Medication risks/benefits/costs/interactions/alternatives discussed with patient. Pt was given an after visit summary which includes diagnoses, current medications & vitals. Pt expressed understanding with the diagnosis and plan        BMI is significantly elevated- in the overweight range. I reviewed diet, exercise and weight control. Discussed weight control in detail, the importance of mainly decreased carbs, and for weight maintenance, exercise; discussed different diets and that it isn't as important to watch the type of foods as it is to decrease calorie intake no matter what type of diet you do, etc.       Total 25 minutes,60 % counseling re: Recommended a weekly \"heart check. \" I went into detail how to do this. Regular exercise is very important to your health; it helps mentally, physically, socially; it prevents injuries if done properly. Exercise, even as simple as walking 20-30 minutes daily has major benefits to your health even though your \"numbers\" are the same in the lab. See if you can add this into your daily regimen and after a few months it will become a regular habit-\"just something you do,\" like brushing your teeth. A combination of aerobic exercise and strengthening and stretching is felt to be the best for you, so this should be your ultimate goal.   This can be done in the privacy of your home or in a group setting as at the gym  Some prefer having a , others prefer to do exercise in groups or individually. Do what \"works\" for you. You need to make it simple and \"fun,\" or you most likely will not continue it. Reviewed symptoms, or lack thereof, of hypertension and elevated cholesterol. Also, discussed symptoms of concern that were noted today in the note above, treatment options( including doing nothing), when to follow up before recommended time frame. Also, answered all questions. I told pt that we would do a cardiac risk calculation for him due to his high triglycerides. I encouraged him to stay on his vitamin D lifelong. The dosage will depend on his result. I reminded pt to do a weekly heart check. This document was written by Tramaine Landrum, as dictated by Gee Kulkarni MD.  I have reviewed and agree with the above note and have made corrections where appropriate Twan Henry M.D.

## 2020-10-07 NOTE — PROGRESS NOTES
Chief Complaint   Patient presents with    Cholesterol Problem     Follow up     1. Have you been to the ER, urgent care clinic since your last visit? Hospitalized since your last visit? No    2. Have you seen or consulted any other health care providers outside of the [address] [place] [address] since your last visit? Include any pap smears or colon screening.  No

## 2020-10-08 LAB
25(OH)D3+25(OH)D2 SERPL-MCNC: 18.5 NG/ML (ref 30–100)
ALBUMIN SERPL-MCNC: 4.7 G/DL (ref 3.8–4.9)
ALBUMIN/GLOB SERPL: 2.2 {RATIO} (ref 1.2–2.2)
ALP SERPL-CCNC: 64 IU/L (ref 39–117)
ALT SERPL-CCNC: 21 IU/L (ref 0–44)
AST SERPL-CCNC: 17 IU/L (ref 0–40)
BILIRUB SERPL-MCNC: 0.3 MG/DL (ref 0–1.2)
BUN SERPL-MCNC: 15 MG/DL (ref 6–24)
BUN/CREAT SERPL: 16 (ref 9–20)
CALCIUM SERPL-MCNC: 9.3 MG/DL (ref 8.7–10.2)
CHLORIDE SERPL-SCNC: 104 MMOL/L (ref 96–106)
CHOLEST SERPL-MCNC: 221 MG/DL (ref 100–199)
CO2 SERPL-SCNC: 22 MMOL/L (ref 20–29)
CREAT SERPL-MCNC: 0.95 MG/DL (ref 0.76–1.27)
GLOBULIN SER CALC-MCNC: 2.1 G/DL (ref 1.5–4.5)
GLUCOSE SERPL-MCNC: 93 MG/DL (ref 65–99)
HDLC SERPL-MCNC: 42 MG/DL
INTERPRETATION, 910389: NORMAL
LDLC SERPL CALC-MCNC: 132 MG/DL (ref 0–99)
POTASSIUM SERPL-SCNC: 4.7 MMOL/L (ref 3.5–5.2)
PROT SERPL-MCNC: 6.8 G/DL (ref 6–8.5)
PSA FREE MFR SERPL: 16.1 %
PSA FREE SERPL-MCNC: 0.74 NG/ML
PSA SERPL-MCNC: 4.6 NG/ML (ref 0–4)
REFLEX CRITERIA: ABNORMAL
SODIUM SERPL-SCNC: 141 MMOL/L (ref 134–144)
TRIGL SERPL-MCNC: 265 MG/DL (ref 0–149)
VLDLC SERPL CALC-MCNC: 47 MG/DL (ref 5–40)

## 2020-12-03 ENCOUNTER — TELEPHONE (OUTPATIENT)
Dept: FAMILY MEDICINE CLINIC | Age: 55
End: 2020-12-03

## 2020-12-03 PROBLEM — Z00.00 ROUTINE GENERAL MEDICAL EXAMINATION AT A HEALTH CARE FACILITY: Status: ACTIVE | Noted: 2020-12-03

## 2020-12-03 NOTE — TELEPHONE ENCOUNTER
----- Message from Margaretvidya Falcon sent at 12/2/2020  6:07 PM EST -----  Regarding: Dr. Sunshine Freedman first and last name: Maria Dolores Colmenares with [facility]    Reason for call: Tacey Beans required yes/no and why: Yes    Best contact number(s): [phone]       Details to clarify the request: Caller would like to know if the lab work done for the pt on   10/7/2020 was part of his routine, annual physical. If so, caller needs a routine diagnosis code.

## 2020-12-03 NOTE — TELEPHONE ENCOUNTER
GREAT NEWS!! All of your recent lab tests are normal or at goal except elevated triglycerides and borderline LDL. Only treatment at his age is continued weight control and reduce saturated fats in the diet. No diabetes, normal liver and kidney tests. PSA stable. The Vitamin D level eas low- I want you t take 5000 units daily( available OTC) until I recommend you to stop. I would continue everything the same and schedule a yearly physical. We can recheck the Vitamin D in 1 year.

## 2021-01-02 PROBLEM — Z00.00 ROUTINE GENERAL MEDICAL EXAMINATION AT A HEALTH CARE FACILITY: Status: RESOLVED | Noted: 2020-12-03 | Resolved: 2021-01-02

## 2021-11-24 ENCOUNTER — OFFICE VISIT (OUTPATIENT)
Dept: FAMILY MEDICINE CLINIC | Age: 56
End: 2021-11-24
Payer: COMMERCIAL

## 2021-11-24 VITALS
OXYGEN SATURATION: 97 % | DIASTOLIC BLOOD PRESSURE: 60 MMHG | BODY MASS INDEX: 25.01 KG/M2 | SYSTOLIC BLOOD PRESSURE: 110 MMHG | WEIGHT: 165 LBS | HEART RATE: 66 BPM | HEIGHT: 68 IN | RESPIRATION RATE: 16 BRPM | TEMPERATURE: 98.2 F

## 2021-11-24 DIAGNOSIS — R09.82 PND (POST-NASAL DRIP): ICD-10-CM

## 2021-11-24 DIAGNOSIS — Z00.00 ROUTINE GENERAL MEDICAL EXAMINATION AT A HEALTH CARE FACILITY: Primary | ICD-10-CM

## 2021-11-24 DIAGNOSIS — E55.9 VITAMIN D DEFICIENCY: ICD-10-CM

## 2021-11-24 PROCEDURE — 99396 PREV VISIT EST AGE 40-64: CPT | Performed by: FAMILY MEDICINE

## 2021-11-24 NOTE — PROGRESS NOTES
Chief Complaint   Patient presents with    Other     wants check up   1. \"Have you been to the ER, urgent care clinic since your last visit? Hospitalized since your last visit? \" No    2. \"Have you seen or consulted any other health care providers outside of the [address] [place] [address] since your last visit? \" No     3. For patients over 45: Has the patient had a colonoscopy? Yes, HM satisfied with blue hyperlink     If the patient is female:    4. For patients over 36: Has the patient had a mammogram? NA based on age or sex    11. For patients over 21: Has the patient had a pap smear?  NA based on age or sex

## 2021-11-24 NOTE — PROGRESS NOTES
HISTORY OF PRESENT ILLNESS  HPI  Senait Cabrera a 60 y. o. male with a history of low back pain radiating to right lower extremity, hepatitis (1985) and vitamin D deficiency, who presents to the office today for f/u of these health problems. Pt is not fasting. Pt comes in for a check up. His mom passed from a stroke related to HTN. He has no personal hx of high BP. Pt asks about the COVID-19 booster. Pt denies unusual SOB, chest pain, and any recent ER visits or hospitalizations. Past Medical History:   Diagnosis Date    Allergic rhinitis     Allergic rhinitis caused by mold 5/13/2018    History of hepatitis A 1995    Hep B & C neg 2014; hepatitis A IgM-, IgG+    Vitamin D deficiency      History reviewed. No pertinent surgical history. Current Outpatient Medications on File Prior to Visit   Medication Sig Dispense Refill    albuterol (PROVENTIL HFA, VENTOLIN HFA, PROAIR HFA) 90 mcg/actuation inhaler Take 1 Puff by inhalation every six (6) hours as needed for Shortness of Breath. 1 Inhaler 11    loratadine (CLARITIN) 10 mg tablet Take 10 mg by mouth daily as needed. 30 Tab prn    ibuprofen (ADVIL) 200 mg tablet Take 3-4 Tabs by mouth every six (6) hours as needed for Pain. No current facility-administered medications on file prior to visit. Allergies   Allergen Reactions    Naprosyn [Naproxen] Other (comments)     dizziness     Family History   Problem Relation Age of Onset    No Known Problems Father     Hypertension Mother     Stroke Mother 48    Other Paternal Grandmother 80        age   [facility] Heart Attack Paternal Grandfather         unclear age   [facility] Stroke Maternal Grandmother 79    Other Maternal Grandfather         WWII     Social History     Socioeconomic History    Marital status:    Tobacco Use    Smoking status: Never Smoker    Smokeless tobacco: Never Used   Vaping Use    Vaping Use: Never used   Substance and Sexual Activity    Alcohol use:  Yes Comment: maybe 1-2 drinks per 2 weeks    Drug use: No    Sexual activity: Yes               Review of Systems   Constitutional: Negative for chills, diaphoresis, fever, malaise/fatigue and weight loss. Eyes: Negative for blurred vision, double vision, pain and redness. Respiratory: Negative for cough, shortness of breath and wheezing. Cardiovascular: Negative for chest pain, palpitations, orthopnea, claudication, leg swelling and PND. Skin: Negative for itching and rash. Neurological: Negative for dizziness, tingling, tremors, sensory change, speech change, focal weakness, seizures, loss of consciousness, weakness and headaches. Results for orders placed or performed in visit on 00/76/58   METABOLIC PANEL, COMPREHENSIVE   Result Value Ref Range    Glucose 93 65 - 99 mg/dL    BUN 15 6 - 24 mg/dL    Creatinine 0.95 0.76 - 1.27 mg/dL    GFR est non-AA 88 >59 mL/min/1.73    GFR est  >59 mL/min/1.73    BUN/Creatinine ratio 16 9 - 20    Sodium 141 134 - 144 mmol/L    Potassium 4.7 3.5 - 5.2 mmol/L    Chloride 104 96 - 106 mmol/L    CO2 22 20 - 29 mmol/L    Calcium 9.3 8.7 - 10.2 mg/dL    Protein, total 6.8 6.0 - 8.5 g/dL    Albumin 4.7 3.8 - 4.9 g/dL    GLOBULIN, TOTAL 2.1 1.5 - 4.5 g/dL    A-G Ratio 2.2 1.2 - 2.2    Bilirubin, total 0.3 0.0 - 1.2 mg/dL    Alk.  phosphatase 64 39 - 117 IU/L    AST (SGOT) 17 0 - 40 IU/L    ALT (SGPT) 21 0 - 44 IU/L   LIPID PANEL   Result Value Ref Range    Cholesterol, total 221 (H) 100 - 199 mg/dL    Triglyceride 265 (H) 0 - 149 mg/dL    HDL Cholesterol 42 >39 mg/dL    VLDL, calculated 47 (H) 5 - 40 mg/dL    LDL, calculated 132 (H) 0 - 99 mg/dL   VITAMIN D, 25 HYDROXY   Result Value Ref Range    VITAMIN D, 25-HYDROXY 18.5 (L) 30.0 - 100.0 ng/mL   PSA W/ REFLX FREE PSA   Result Value Ref Range    Prostate Specific Ag 4.6 (H) 0.0 - 4.0 ng/mL    Reflex Criteria Comment    PSA, FREE   Result Value Ref Range    PSA, Free 0.74 N/A ng/mL    % Free PSA 16.1 %   CVD REPORT Result Value Ref Range    INTERPRETATION Note              Physical Exam  Visit Vitals  /60 (BP 1 Location: Left arm, BP Patient Position: Sitting, BP Cuff Size: Large adult)   Pulse 66   Temp 98.2 °F (36.8 °C)   Resp 16   Ht 5' 8\" (1.727 m)   Wt 165 lb (74.8 kg)   SpO2 97%   BMI 25.09 kg/m²         General:  Alert, cooperative, no distress, appears stated age. Head:  Normocephalic, without obvious abnormality, atraumatic. Eyes:  Conjunctivae/corneas clear. PERRL, EOMs intact. Fundi benign   Ears:  Normal TMs and external ear canals both ears. Nose: Nares normal. Septum midline. Mucosa normal. No drainage or sinus tenderness. Throat: Lips, mucosa, and tongue normal. Teeth and gums normal.   Neck: Supple, symmetrical, trachea midline, no adenopathy, thyroid: no enlargement/tenderness/nodules, no carotid bruit and no JVD. Back:   Symmetric, no curvature. ROM normal. No CVA tenderness. Lungs:   Clear to auscultation bilaterally. Chest wall:  No tenderness or deformity. Heart:  Regular rate and rhythm, S1, S2 normal, no murmur, click, rub or gallop. Abdomen:   Soft, non-tender. Bowel sounds normal. No masses,  No organomegaly. Extremities: Extremities normal, atraumatic, no cyanosis or edema. Pulses: 2+ and symmetric all extremities. Skin: Skin color, texture, turgor normal. No rashes or lesions   Lymph nodes: Cervical, supraclavicular, and axillary nodes normal.   Neurologic: CNII-XII intact. Normal strength, sensation and reflexes throughout. ASSESSMENT and PLAN    ICD-10-CM ICD-9-CM    1. Routine general medical examination at a health care facility  Z00.00 V70.0 LIPID PANEL      CBC W/O DIFF      METABOLIC PANEL, COMPREHENSIVE      PSA SCREENING (SCREENING)   2. Vitamin D deficiency  E55.9 268.9    3. PND (post-nasal drip)  R09.82 784.91      Diagnoses and all orders for this visit:    1.  Routine general medical examination at a health care facility  -     LIPID PANEL; Future  -     CBC W/O DIFF; Future  -     METABOLIC PANEL, COMPREHENSIVE; Future  -     PSA SCREENING (SCREENING); Future    2. Vitamin D deficiency    3. PND (post-nasal drip)            lab results and schedule of future lab studies reviewed with patient  reviewed diet, exercise and weight control  cardiovascular risk and specific lipid/LDL goals reviewed  reviewed medications and side effects in detail  Please call my office if there are any questions- [phone]. I will arrange for follow up after the lab tests done from today return  Recommended a weekly \"heart check. \" I went into detail how to do this. Call for refills on medications as needed. Discussed expected course/resolution/complications of diagnosis in detail with patient. Medication risks/benefits/costs/interactions/alternatives discussed with patient. Pt was given an after visit summary which includes diagnoses, current medications & vitals. Pt expressed understanding with the diagnosis and plan      Total 30 minutes  re:  Regular exercise is very important to your health; it helps mentally, physically, socially; it prevents injuries if done properly. Exercise, even as simple as walking 20-30 minutes daily has major benefits to your health even though your \"numbers\" are the same in the lab. See if you can add this into your daily regimen and after a few months it will become a regular habit-\"just something you do,\" like brushing your teeth. A combination of aerobic exercise and strengthening and stretching is felt to be the best for you, so this should be your ultimate goal.   This can be done in the privacy of your home or in a group setting as at the gym  Some prefer having a , others prefer to do exercise in groups or individually. Do what \"works\" for you. You need to make it simple and \"fun,\" or you most likely will not continue it. Recommended a weekly \"heart check. \" I went into detail how to do this.  Also, discussed symptoms of concern that were noted today in the note above, treatment options( including doing nothing), when to follow up before recommended time frame. Also, answered all questions. We discussed the benefit/risk of getting the booster for COVID. He will come back another day to do his lab work. I reminded him to do heart check on a weekly basis. Because he is having nighttime urination once a night and it is not getting better with fluid restriction, I talked about decreased bladder irritants and also further decreasing the fluid intake (he has a good stream) and he will let us know if that does not improve. We also talked about some intermittent insomnia where he will wake up at night occasionally. We talked about good sleep hygiene and avoiding stimulants and caffeine for at least 6 to even 10 hours prior to bed. .This document was written by Rangel Maguire, as dictated by Sweta Frias MD.  I have reviewed and agree with the above note and have made corrections where appropriate Twan Campuzano M.D.

## 2021-12-02 ENCOUNTER — LAB ONLY (OUTPATIENT)
Dept: FAMILY MEDICINE CLINIC | Age: 56
End: 2021-12-02

## 2021-12-02 DIAGNOSIS — Z00.00 ROUTINE GENERAL MEDICAL EXAMINATION AT A HEALTH CARE FACILITY: ICD-10-CM

## 2021-12-02 LAB
ALBUMIN SERPL-MCNC: 4.1 G/DL (ref 3.5–5)
ALBUMIN/GLOB SERPL: 1.3 {RATIO} (ref 1.1–2.2)
ALP SERPL-CCNC: 65 U/L (ref 45–117)
ALT SERPL-CCNC: 35 U/L (ref 12–78)
ANION GAP SERPL CALC-SCNC: 5 MMOL/L (ref 5–15)
AST SERPL-CCNC: 15 U/L (ref 15–37)
BILIRUB SERPL-MCNC: 0.4 MG/DL (ref 0.2–1)
BUN SERPL-MCNC: 18 MG/DL (ref 6–20)
BUN/CREAT SERPL: 18 (ref 12–20)
CALCIUM SERPL-MCNC: 9.3 MG/DL (ref 8.5–10.1)
CHLORIDE SERPL-SCNC: 107 MMOL/L (ref 97–108)
CHOLEST SERPL-MCNC: 240 MG/DL
CO2 SERPL-SCNC: 28 MMOL/L (ref 21–32)
CREAT SERPL-MCNC: 0.98 MG/DL (ref 0.7–1.3)
ERYTHROCYTE [DISTWIDTH] IN BLOOD BY AUTOMATED COUNT: 12.8 % (ref 11.5–14.5)
GLOBULIN SER CALC-MCNC: 3.1 G/DL (ref 2–4)
GLUCOSE SERPL-MCNC: 89 MG/DL (ref 65–100)
HCT VFR BLD AUTO: 43.9 % (ref 36.6–50.3)
HDLC SERPL-MCNC: 47 MG/DL
HDLC SERPL: 5.1 {RATIO} (ref 0–5)
HGB BLD-MCNC: 14.5 G/DL (ref 12.1–17)
LDLC SERPL CALC-MCNC: ABNORMAL MG/DL (ref 0–100)
LDLC SERPL DIRECT ASSAY-MCNC: 110 MG/DL (ref 0–100)
MCH RBC QN AUTO: 29.9 PG (ref 26–34)
MCHC RBC AUTO-ENTMCNC: 33 G/DL (ref 30–36.5)
MCV RBC AUTO: 90.5 FL (ref 80–99)
NRBC # BLD: 0 K/UL (ref 0–0.01)
NRBC BLD-RTO: 0 PER 100 WBC
PLATELET # BLD AUTO: 228 K/UL (ref 150–400)
PMV BLD AUTO: 10 FL (ref 8.9–12.9)
POTASSIUM SERPL-SCNC: 4.4 MMOL/L (ref 3.5–5.1)
PROT SERPL-MCNC: 7.2 G/DL (ref 6.4–8.2)
PSA SERPL-MCNC: 6.5 NG/ML (ref 0.01–4)
RBC # BLD AUTO: 4.85 M/UL (ref 4.1–5.7)
SODIUM SERPL-SCNC: 140 MMOL/L (ref 136–145)
TRIGL SERPL-MCNC: 411 MG/DL (ref ?–150)
VLDLC SERPL CALC-MCNC: ABNORMAL MG/DL
WBC # BLD AUTO: 5.8 K/UL (ref 4.1–11.1)

## 2022-01-26 DIAGNOSIS — R97.20 ELEVATED PSA: Primary | ICD-10-CM

## 2022-01-26 NOTE — TELEPHONE ENCOUNTER
----- Message from Renita Bell MD sent at 1/26/2022  1:37 AM EST -----  Please call pt:    Besides the usual elevated Triglycerides( with OK HDL @ 47 and LDL of 110), the PSA is a little elevated. Normal CBC( red and white blood cells and platelets). No diabetes, normal liver and kidney tests. The recommendation is to repeat with a free PSA in 2-3 months , but I want to treat with an antibiotic Cipro 500 mg BID x 10 days 1st and then repeat PSA with free PSA. You are getting close to the age where your risk of stroke and heart attack along with your elevated triglycerides warrants considering a medication to reduce that risk- at about the age of 59; continue weight control and regular exercise until then.

## 2022-01-26 NOTE — PROGRESS NOTES
Please call pt:    Besides the usual elevated Triglycerides( with OK HDL @ 47 and LDL of 110), the PSA is a little elevated. Normal CBC( red and white blood cells and platelets). No diabetes, normal liver and kidney tests. The recommendation is to repeat with a free PSA in 2-3 months , but I want to treat with an antibiotic Cipro 500 mg BID x 10 days 1st and then repeat PSA with free PSA. You are getting close to the age where your risk of stroke and heart attack along with your elevated triglycerides warrants considering a medication to reduce that risk- at about the age of 59; continue weight control and regular exercise until then.

## 2022-02-01 RX ORDER — CIPROFLOXACIN 500 MG/1
500 TABLET ORAL 2 TIMES DAILY
Qty: 20 TABLET | Refills: 0 | Status: SHIPPED | OUTPATIENT
Start: 2022-02-01 | End: 2022-02-11

## 2022-02-01 NOTE — TELEPHONE ENCOUNTER
Cipro is very effective in prostate infections and rarely causes any side effects; used all the time in women for bladder infections. You should do fine with it.

## 2022-03-19 PROBLEM — J30.89 ALLERGIC RHINITIS CAUSED BY MOLD: Status: ACTIVE | Noted: 2018-05-13

## 2022-04-27 ENCOUNTER — LAB ONLY (OUTPATIENT)
Dept: FAMILY MEDICINE CLINIC | Age: 57
End: 2022-04-27

## 2022-04-27 DIAGNOSIS — R97.20 ELEVATED PSA: ICD-10-CM

## 2022-04-29 LAB
% FREE PSA, 480797: 15.3 %
PSA SERPL-MCNC: 5.3 NG/ML (ref 0–4)
PSA, FREE, 480853: 0.81 NG/ML
REFLEX CRITERIA: ABNORMAL

## 2023-05-20 RX ORDER — LORATADINE 10 MG/1
10 TABLET ORAL DAILY PRN
COMMUNITY

## 2023-05-20 RX ORDER — ALBUTEROL SULFATE 90 UG/1
1 AEROSOL, METERED RESPIRATORY (INHALATION) EVERY 6 HOURS PRN
COMMUNITY
Start: 2019-01-29

## 2023-05-20 RX ORDER — IBUPROFEN 200 MG
600-800 TABLET ORAL EVERY 6 HOURS PRN
COMMUNITY

## 2023-08-18 ENCOUNTER — OFFICE VISIT (OUTPATIENT)
Age: 58
End: 2023-08-18
Payer: COMMERCIAL

## 2023-08-18 VITALS
SYSTOLIC BLOOD PRESSURE: 122 MMHG | WEIGHT: 164 LBS | TEMPERATURE: 97.8 F | BODY MASS INDEX: 24.86 KG/M2 | DIASTOLIC BLOOD PRESSURE: 70 MMHG | HEIGHT: 68 IN | HEART RATE: 72 BPM | RESPIRATION RATE: 16 BRPM | OXYGEN SATURATION: 99 %

## 2023-08-18 DIAGNOSIS — E55.9 VITAMIN D DEFICIENCY, UNSPECIFIED: ICD-10-CM

## 2023-08-18 DIAGNOSIS — N13.8 BPH WITH URINARY OBSTRUCTION: ICD-10-CM

## 2023-08-18 DIAGNOSIS — Z00.00 PHYSICAL EXAM: Primary | ICD-10-CM

## 2023-08-18 DIAGNOSIS — R09.82 POSTNASAL DRIP: ICD-10-CM

## 2023-08-18 DIAGNOSIS — Z11.59 NEED FOR HEPATITIS C SCREENING TEST: ICD-10-CM

## 2023-08-18 DIAGNOSIS — Z12.5 SCREENING PSA (PROSTATE SPECIFIC ANTIGEN): ICD-10-CM

## 2023-08-18 DIAGNOSIS — R68.82 DECREASED LIBIDO: ICD-10-CM

## 2023-08-18 DIAGNOSIS — N40.1 BPH WITH URINARY OBSTRUCTION: ICD-10-CM

## 2023-08-18 PROCEDURE — 99396 PREV VISIT EST AGE 40-64: CPT | Performed by: FAMILY MEDICINE

## 2023-08-18 SDOH — ECONOMIC STABILITY: FOOD INSECURITY: WITHIN THE PAST 12 MONTHS, YOU WORRIED THAT YOUR FOOD WOULD RUN OUT BEFORE YOU GOT MONEY TO BUY MORE.: OFTEN TRUE

## 2023-08-18 SDOH — ECONOMIC STABILITY: HOUSING INSECURITY
IN THE LAST 12 MONTHS, WAS THERE A TIME WHEN YOU DID NOT HAVE A STEADY PLACE TO SLEEP OR SLEPT IN A SHELTER (INCLUDING NOW)?: NO

## 2023-08-18 SDOH — ECONOMIC STABILITY: FOOD INSECURITY: WITHIN THE PAST 12 MONTHS, THE FOOD YOU BOUGHT JUST DIDN'T LAST AND YOU DIDN'T HAVE MONEY TO GET MORE.: NEVER TRUE

## 2023-08-18 SDOH — ECONOMIC STABILITY: INCOME INSECURITY: HOW HARD IS IT FOR YOU TO PAY FOR THE VERY BASICS LIKE FOOD, HOUSING, MEDICAL CARE, AND HEATING?: NOT HARD AT ALL

## 2023-08-18 ASSESSMENT — ENCOUNTER SYMPTOMS
EYE REDNESS: 0
CONSTIPATION: 0
NAUSEA: 0
WHEEZING: 0
BACK PAIN: 0
VOMITING: 0
SHORTNESS OF BREATH: 0
EYE DISCHARGE: 0
SORE THROAT: 0
DIARRHEA: 0
COUGH: 0
BLOOD IN STOOL: 0
EYE PAIN: 0
ABDOMINAL PAIN: 0
STRIDOR: 0

## 2023-08-18 ASSESSMENT — PATIENT HEALTH QUESTIONNAIRE - PHQ9
SUM OF ALL RESPONSES TO PHQ QUESTIONS 1-9: 0
2. FEELING DOWN, DEPRESSED OR HOPELESS: 0
SUM OF ALL RESPONSES TO PHQ QUESTIONS 1-9: 0
1. LITTLE INTEREST OR PLEASURE IN DOING THINGS: 0
SUM OF ALL RESPONSES TO PHQ9 QUESTIONS 1 & 2: 0

## 2023-08-18 NOTE — PROGRESS NOTES
Chief Complaint   Patient presents with    Annual Exam    1. \"Have you been to the ER, urgent care clinic since your last visit? Hospitalized since your last visit? \" no  2. \"Have you seen or consulted any other health care providers outside of the [address] [place] [address] since your last visit? \" no  3. For patients over 45: Has the patient had a colonoscopy? Yes     If the patient is female:    4. For patients over 40: Has the patient had a mammogram? N/A    5. For patients over 21: Has the patient had a pap smear?  N/A

## 2023-08-18 NOTE — PROGRESS NOTES
Subjective     HPI    Ty Sifuentes is a 59 y.o. male with a history of low back pain radiating to right lower extremity, hepatitis (1985) and vitamin D deficiency, who presents to the office today for a compete physical and for f/u of these health problems. Pt notes that having nocturia with a decreased stream. He denies any ankle swelling. He would like to stay away from any medications. He states that he does drink caffeine in the mornings, but stays away from artificial sugars. Pt is concerned about a \"growth\" on the dorsal side of his R foot. His symptoms are aggravated by tightly-laced shoes, but relieved by loosely-tied shoes. Pt denies unusual SOB, chest pain, and any recent ER visits or hospitalizations. Past Medical History:   Diagnosis Date    Allergic rhinitis     Allergic rhinitis caused by mold 5/13/2018    History of hepatitis A 1995    Hep B & C neg 2014; hepatitis A IgM-, IgG+    Vitamin D deficiency      History reviewed. No pertinent surgical history. Current Outpatient Medications on File Prior to Visit   Medication Sig Dispense Refill    albuterol sulfate HFA (PROVENTIL;VENTOLIN;PROAIR) 108 (90 Base) MCG/ACT inhaler Inhale 1 puff into the lungs every 6 hours as needed      ibuprofen (ADVIL;MOTRIN) 200 MG tablet Take 3-4 tablets by mouth every 6 hours as needed      loratadine (CLARITIN) 10 MG tablet Take 1 tablet by mouth daily as needed       No current facility-administered medications on file prior to visit.      Allergies   Allergen Reactions    Naproxen Other (See Comments)     dizziness     Family History   Problem Relation Age of Onset    Stroke Maternal Grandmother 79    Heart Attack Paternal Grandfather         unclear age    Other Paternal Grandmother 80        age    Stroke Mother 48    Hypertension Mother     No Known Problems Father     Other Maternal Grandfather         WWII     Social History     Socioeconomic History    Marital status:      Spouse name:

## 2023-08-24 ENCOUNTER — NURSE ONLY (OUTPATIENT)
Age: 58
End: 2023-08-24

## 2023-08-24 DIAGNOSIS — Z00.00 PHYSICAL EXAM: ICD-10-CM

## 2023-08-24 DIAGNOSIS — R68.82 DECREASED LIBIDO: ICD-10-CM

## 2023-08-24 DIAGNOSIS — Z11.59 NEED FOR HEPATITIS C SCREENING TEST: ICD-10-CM

## 2023-08-24 DIAGNOSIS — Z12.5 SCREENING PSA (PROSTATE SPECIFIC ANTIGEN): ICD-10-CM

## 2023-08-24 LAB
ALBUMIN SERPL-MCNC: 4.2 G/DL (ref 3.5–5)
ALBUMIN/GLOB SERPL: 1.4 (ref 1.1–2.2)
ALP SERPL-CCNC: 69 U/L (ref 45–117)
ALT SERPL-CCNC: 27 U/L (ref 12–78)
ANION GAP SERPL CALC-SCNC: 2 MMOL/L (ref 5–15)
APPEARANCE UR: CLEAR
AST SERPL-CCNC: 14 U/L (ref 15–37)
BACTERIA URNS QL MICRO: NEGATIVE /HPF
BILIRUB SERPL-MCNC: 0.7 MG/DL (ref 0.2–1)
BILIRUB UR QL: NEGATIVE
BUN SERPL-MCNC: 15 MG/DL (ref 6–20)
BUN/CREAT SERPL: 14 (ref 12–20)
CALCIUM SERPL-MCNC: 9.2 MG/DL (ref 8.5–10.1)
CHLORIDE SERPL-SCNC: 108 MMOL/L (ref 97–108)
CHOLEST SERPL-MCNC: 215 MG/DL
CO2 SERPL-SCNC: 29 MMOL/L (ref 21–32)
COLOR UR: NORMAL
CREAT SERPL-MCNC: 1.06 MG/DL (ref 0.7–1.3)
EPITH CASTS URNS QL MICRO: NORMAL /LPF
GLOBULIN SER CALC-MCNC: 2.9 G/DL (ref 2–4)
GLUCOSE SERPL-MCNC: 93 MG/DL (ref 65–100)
GLUCOSE UR STRIP.AUTO-MCNC: NEGATIVE MG/DL
HDLC SERPL-MCNC: 43 MG/DL
HDLC SERPL: 5 (ref 0–5)
HGB UR QL STRIP: NEGATIVE
HYALINE CASTS URNS QL MICRO: NORMAL /LPF (ref 0–5)
KETONES UR QL STRIP.AUTO: NEGATIVE MG/DL
LDLC SERPL CALC-MCNC: 102.2 MG/DL (ref 0–100)
LEUKOCYTE ESTERASE UR QL STRIP.AUTO: NEGATIVE
NITRITE UR QL STRIP.AUTO: NEGATIVE
PH UR STRIP: 6 (ref 5–8)
POTASSIUM SERPL-SCNC: 4.2 MMOL/L (ref 3.5–5.1)
PROT SERPL-MCNC: 7.1 G/DL (ref 6.4–8.2)
PROT UR STRIP-MCNC: NEGATIVE MG/DL
PSA SERPL-MCNC: 6 NG/ML (ref 0.01–4)
RBC #/AREA URNS HPF: NORMAL /HPF (ref 0–5)
SODIUM SERPL-SCNC: 139 MMOL/L (ref 136–145)
SP GR UR REFRACTOMETRY: 1.02 (ref 1–1.03)
TRIGL SERPL-MCNC: 349 MG/DL
UROBILINOGEN UR QL STRIP.AUTO: 0.2 EU/DL (ref 0.2–1)
VLDLC SERPL CALC-MCNC: 69.8 MG/DL
WBC URNS QL MICRO: NORMAL /HPF (ref 0–4)

## 2023-08-25 LAB
ERYTHROCYTE [DISTWIDTH] IN BLOOD BY AUTOMATED COUNT: 13.1 % (ref 11.5–14.5)
HCT VFR BLD AUTO: 42.1 % (ref 36.6–50.3)
HCV AB SER IA-ACNC: 0.06 INDEX
HCV AB SERPL QL IA: NONREACTIVE
HGB BLD-MCNC: 13.7 G/DL (ref 12.1–17)
MCH RBC QN AUTO: 29.8 PG (ref 26–34)
MCHC RBC AUTO-ENTMCNC: 32.5 G/DL (ref 30–36.5)
MCV RBC AUTO: 91.7 FL (ref 80–99)
NRBC # BLD: 0 K/UL (ref 0–0.01)
NRBC BLD-RTO: 0 PER 100 WBC
PLATELET # BLD AUTO: 236 K/UL (ref 150–400)
PMV BLD AUTO: 9.9 FL (ref 8.9–12.9)
RBC # BLD AUTO: 4.59 M/UL (ref 4.1–5.7)
WBC # BLD AUTO: 6.2 K/UL (ref 4.1–11.1)

## 2023-08-26 LAB — TESTOST SERPL-MCNC: 299 NG/DL (ref 264–916)

## 2024-03-14 ENCOUNTER — OFFICE VISIT (OUTPATIENT)
Age: 59
End: 2024-03-14
Payer: COMMERCIAL

## 2024-03-14 VITALS
SYSTOLIC BLOOD PRESSURE: 126 MMHG | WEIGHT: 170 LBS | HEIGHT: 68 IN | BODY MASS INDEX: 25.76 KG/M2 | OXYGEN SATURATION: 98 % | RESPIRATION RATE: 16 BRPM | TEMPERATURE: 97.8 F | DIASTOLIC BLOOD PRESSURE: 72 MMHG | HEART RATE: 67 BPM

## 2024-03-14 DIAGNOSIS — M79.651 PAIN OF RIGHT THIGH: Primary | ICD-10-CM

## 2024-03-14 PROCEDURE — 99214 OFFICE O/P EST MOD 30 MIN: CPT | Performed by: FAMILY MEDICINE

## 2024-03-14 ASSESSMENT — PATIENT HEALTH QUESTIONNAIRE - PHQ9
SUM OF ALL RESPONSES TO PHQ QUESTIONS 1-9: 0
SUM OF ALL RESPONSES TO PHQ QUESTIONS 1-9: 0
2. FEELING DOWN, DEPRESSED OR HOPELESS: 0
SUM OF ALL RESPONSES TO PHQ QUESTIONS 1-9: 0
1. LITTLE INTEREST OR PLEASURE IN DOING THINGS: 0
SUM OF ALL RESPONSES TO PHQ9 QUESTIONS 1 & 2: 0
SUM OF ALL RESPONSES TO PHQ QUESTIONS 1-9: 0

## 2024-03-14 ASSESSMENT — ENCOUNTER SYMPTOMS
EYE PAIN: 0
EYE REDNESS: 0
COUGH: 0
SHORTNESS OF BREATH: 0
WHEEZING: 0

## 2024-03-14 NOTE — PROGRESS NOTES
Chief Complaint   Patient presents with    Leg Pain     Rt for 2 weeks      \"Have you been to the ER, urgent care clinic since your last visit?  Hospitalized since your last visit?\"    NO    “Have you seen or consulted any other health care providers outside of [facility] since your last visit?”    urology        
into your daily regimen and after a few months it will become a regular habit-\"just something you do,\" like brushing your teeth. A combination of aerobic exercise and strengthening and stretching is felt to be the best for you, so this should be your ultimate goal. This can be done in the privacy of your home or in a group setting as at the gym  Some prefer having a , others prefer to do exercise in groups or individually.  Do what \"works\" for you. You need to make it simple and \"fun,\" or you most likely will not continue it.     Also, discussed symptoms of concern that were noted today in the note above, treatment options( including doing nothing), when to follow up before recommended time frame. Also, answered all questions.    Informed pt this is acting more like a muscle injury that is a mild one as it is getting better. It's aggravated by engaging the muscle. Pt when asked was worried about it being a clot, so we ordered a venous ultrasound to rule that out. I told him that if negative, his discomfort should resolve slowly over the next couple weeks, and talked about how to avoid aggravating a muscle injury, what kind of things to avoid, the importance of stretching, and how to do that for this particular area. If this discomfort doesn't continue to resolve over the next few weeks, he should let me know.     I, Tone Marie, am scribing for and in the presence of Omkar Vega MD. 3/14/24/8:43 AM EDT  I have reviewed and agree with the above note and have made corrections where appropriate Omkar Vega M.D.  NIKOLAY, Omkar Vega MD, personally performed the services described in this documentation as scribed, in my presence, and it is both accurate and complete.

## 2024-03-15 ENCOUNTER — HOSPITAL ENCOUNTER (OUTPATIENT)
Facility: HOSPITAL | Age: 59
Discharge: HOME | End: 2024-03-15
Attending: FAMILY MEDICINE
Payer: COMMERCIAL

## 2024-03-15 DIAGNOSIS — M79.651 PAIN OF RIGHT THIGH: ICD-10-CM

## 2024-03-15 PROCEDURE — 93971 EXTREMITY STUDY: CPT
